# Patient Record
Sex: FEMALE | Employment: OTHER | URBAN - METROPOLITAN AREA
[De-identification: names, ages, dates, MRNs, and addresses within clinical notes are randomized per-mention and may not be internally consistent; named-entity substitution may affect disease eponyms.]

---

## 2024-05-30 ENCOUNTER — OFFICE VISIT (OUTPATIENT)
Dept: INTERNAL MEDICINE CLINIC | Age: 79
End: 2024-05-30
Payer: MEDICARE

## 2024-05-30 VITALS
HEIGHT: 65 IN | HEART RATE: 72 BPM | WEIGHT: 125.4 LBS | SYSTOLIC BLOOD PRESSURE: 122 MMHG | BODY MASS INDEX: 20.89 KG/M2 | DIASTOLIC BLOOD PRESSURE: 86 MMHG | OXYGEN SATURATION: 98 %

## 2024-05-30 DIAGNOSIS — R73.01 IMPAIRED FASTING BLOOD SUGAR: ICD-10-CM

## 2024-05-30 DIAGNOSIS — L43.9 LICHEN PLANUS: ICD-10-CM

## 2024-05-30 DIAGNOSIS — H81.09 VERTIGO, LABYRINTHINE, UNSPECIFIED LATERALITY: ICD-10-CM

## 2024-05-30 DIAGNOSIS — K21.9 GASTROESOPHAGEAL REFLUX DISEASE WITHOUT ESOPHAGITIS: ICD-10-CM

## 2024-05-30 DIAGNOSIS — N95.2 ATROPHIC VAGINITIS: ICD-10-CM

## 2024-05-30 DIAGNOSIS — I10 PRIMARY HYPERTENSION: ICD-10-CM

## 2024-05-30 DIAGNOSIS — Z79.890 HORMONE REPLACEMENT THERAPY: ICD-10-CM

## 2024-05-30 DIAGNOSIS — E03.9 ACQUIRED HYPOTHYROIDISM: Primary | ICD-10-CM

## 2024-05-30 DIAGNOSIS — R41.89 COGNITIVE IMPAIRMENT: ICD-10-CM

## 2024-05-30 DIAGNOSIS — M81.0 AGE-RELATED OSTEOPOROSIS WITHOUT CURRENT PATHOLOGICAL FRACTURE: ICD-10-CM

## 2024-05-30 DIAGNOSIS — E03.9 ACQUIRED HYPOTHYROIDISM: ICD-10-CM

## 2024-05-30 DIAGNOSIS — J30.89 ENVIRONMENTAL AND SEASONAL ALLERGIES: ICD-10-CM

## 2024-05-30 PROCEDURE — G8427 DOCREV CUR MEDS BY ELIG CLIN: HCPCS | Performed by: INTERNAL MEDICINE

## 2024-05-30 PROCEDURE — G8400 PT W/DXA NO RESULTS DOC: HCPCS | Performed by: INTERNAL MEDICINE

## 2024-05-30 PROCEDURE — 1036F TOBACCO NON-USER: CPT | Performed by: INTERNAL MEDICINE

## 2024-05-30 PROCEDURE — 3079F DIAST BP 80-89 MM HG: CPT | Performed by: INTERNAL MEDICINE

## 2024-05-30 PROCEDURE — G8420 CALC BMI NORM PARAMETERS: HCPCS | Performed by: INTERNAL MEDICINE

## 2024-05-30 PROCEDURE — 3074F SYST BP LT 130 MM HG: CPT | Performed by: INTERNAL MEDICINE

## 2024-05-30 PROCEDURE — 1123F ACP DISCUSS/DSCN MKR DOCD: CPT | Performed by: INTERNAL MEDICINE

## 2024-05-30 PROCEDURE — 1090F PRES/ABSN URINE INCON ASSESS: CPT | Performed by: INTERNAL MEDICINE

## 2024-05-30 PROCEDURE — 99204 OFFICE O/P NEW MOD 45 MIN: CPT | Performed by: INTERNAL MEDICINE

## 2024-05-30 RX ORDER — FLUTICASONE PROPIONATE 50 MCG
1 SPRAY, SUSPENSION (ML) NASAL DAILY
COMMUNITY

## 2024-05-30 RX ORDER — FUROSEMIDE 20 MG/1
TABLET ORAL
COMMUNITY
Start: 2024-04-01

## 2024-05-30 RX ORDER — OMEPRAZOLE 20 MG/1
20 CAPSULE, DELAYED RELEASE ORAL DAILY
COMMUNITY

## 2024-05-30 RX ORDER — LEVOTHYROXINE SODIUM 0.07 MG/1
TABLET ORAL
COMMUNITY
Start: 2024-04-01

## 2024-05-30 RX ORDER — IBUPROFEN 200 MG
200 TABLET ORAL EVERY 6 HOURS PRN
COMMUNITY
End: 2024-05-30

## 2024-05-30 RX ORDER — ESTRADIOL 0.5 MG/1
TABLET ORAL
COMMUNITY
Start: 2024-04-01

## 2024-05-30 RX ORDER — MECLIZINE HYDROCHLORIDE 25 MG/1
TABLET ORAL
COMMUNITY
Start: 2024-04-21

## 2024-05-30 RX ORDER — CETIRIZINE HYDROCHLORIDE 10 MG/1
10 TABLET ORAL DAILY
COMMUNITY

## 2024-05-30 RX ORDER — ACETAMINOPHEN 500 MG
500 TABLET ORAL EVERY 6 HOURS PRN
COMMUNITY

## 2024-05-30 SDOH — ECONOMIC STABILITY: INCOME INSECURITY: HOW HARD IS IT FOR YOU TO PAY FOR THE VERY BASICS LIKE FOOD, HOUSING, MEDICAL CARE, AND HEATING?: NOT HARD AT ALL

## 2024-05-30 SDOH — ECONOMIC STABILITY: FOOD INSECURITY: WITHIN THE PAST 12 MONTHS, THE FOOD YOU BOUGHT JUST DIDN'T LAST AND YOU DIDN'T HAVE MONEY TO GET MORE.: NEVER TRUE

## 2024-05-30 SDOH — ECONOMIC STABILITY: HOUSING INSECURITY
IN THE LAST 12 MONTHS, WAS THERE A TIME WHEN YOU DID NOT HAVE A STEADY PLACE TO SLEEP OR SLEPT IN A SHELTER (INCLUDING NOW)?: NO

## 2024-05-30 SDOH — ECONOMIC STABILITY: FOOD INSECURITY: WITHIN THE PAST 12 MONTHS, YOU WORRIED THAT YOUR FOOD WOULD RUN OUT BEFORE YOU GOT MONEY TO BUY MORE.: NEVER TRUE

## 2024-05-30 ASSESSMENT — PATIENT HEALTH QUESTIONNAIRE - PHQ9
1. LITTLE INTEREST OR PLEASURE IN DOING THINGS: NOT AT ALL
SUM OF ALL RESPONSES TO PHQ QUESTIONS 1-9: 0
SUM OF ALL RESPONSES TO PHQ QUESTIONS 1-9: 0
2. FEELING DOWN, DEPRESSED OR HOPELESS: NOT AT ALL
SUM OF ALL RESPONSES TO PHQ9 QUESTIONS 1 & 2: 0
SUM OF ALL RESPONSES TO PHQ QUESTIONS 1-9: 0
SUM OF ALL RESPONSES TO PHQ QUESTIONS 1-9: 0

## 2024-05-30 ASSESSMENT — ENCOUNTER SYMPTOMS
BACK PAIN: 0
COLOR CHANGE: 0
WHEEZING: 0
CONSTIPATION: 0
ABDOMINAL PAIN: 0
SHORTNESS OF BREATH: 0
NAUSEA: 0
VOMITING: 0
COUGH: 0
SORE THROAT: 0
SINUS PRESSURE: 0
CHEST TIGHTNESS: 0

## 2024-05-30 NOTE — ASSESSMENT & PLAN NOTE
patient has both oral and cutaneous lesions in the past, currently none in the oral cavity but has scattered spots on her lower extremities.  No further intervention at this point

## 2024-05-30 NOTE — ASSESSMENT & PLAN NOTE
currently not taking omeprazole and denies any symptoms, will continue to monitor off medication, reinforced antireflux diet and GERD lifestyle modification changes

## 2024-05-30 NOTE — ASSESSMENT & PLAN NOTE
will update labs and make recommendation on levothyroxine dose as patient is reporting this has been fluctuating lately but has been compliant with taking current recommended dose.

## 2024-05-30 NOTE — ASSESSMENT & PLAN NOTE
patient believes she is up-to-date with recommended vaccines, has been doing well on cetirizine and Flonase, will continue same, continue to avoid known triggers if able to

## 2024-05-30 NOTE — ASSESSMENT & PLAN NOTE
patient states she has been on estradiol 0.5 mg for large number of years, reports it is mostly for vaginal pain and atrophic vaginitis because the cream did not work, she is aware of the risks associated with long-term hormone replacement therapy especially at her age however she does not want to stop it or attempt reducing the dose because it has been working just fine for her.  She does not go for mammograms routinely and last one was over 2 years ago.

## 2024-05-30 NOTE — ASSESSMENT & PLAN NOTE
patient reports DEXA scan did show osteoporosis however she is declining any form of therapy, she does not want to have updated study and admits to not taking calcium and vitamin D consistently.  Recommended daily weightbearing activity and encouraged to at least take daily vitamin D and calcium supplements if will not reconsider therapy

## 2024-05-30 NOTE — ASSESSMENT & PLAN NOTE
stable, has been using as needed meclizine, reports had imaging and workup done in [place], will await records

## 2024-05-30 NOTE — ASSESSMENT & PLAN NOTE
patient has been diagnosed with dementia however there are no other records or information about her diagnosis,  reports she did see neurology, he also privately reported some paranoid behavior.  Patient currently wearing an ankle monitor at the [facility] community she is residing in her right now and she is being placed in the memory unit until further evaluation.  Again will await previous records

## 2024-05-30 NOTE — ASSESSMENT & PLAN NOTE
patient reports she had multiple allergies to antihypertensives in the past and has been maintained on 20 mg of Lasix for blood pressure management.  Will continue same for now until records available from [place], [facility] maintaining healthy low-salt diet and active lifestyle

## 2024-05-30 NOTE — PROGRESS NOTES
ASSESSMENT/PLAN:  1. Acquired hypothyroidism  Assessment & Plan:    will update labs and make recommendation on levothyroxine dose as patient is reporting this has been fluctuating lately but has been compliant with taking current recommended dose.  Orders:  -     Lipid Panel; Future  -     TSH with Reflex to FT4; Future  2. Primary hypertension  Assessment & Plan:    patient reports she had multiple allergies to antihypertensives in the past and has been maintained on 20 mg of Lasix for blood pressure management.  Will continue same for now until records available from [place], encouraged maintaining healthy low-salt diet and active lifestyle  Orders:  -     CBC; Future  -     Comprehensive Metabolic Panel; Future  -     Lipid Panel; Future  3. Environmental and seasonal allergies  Assessment & Plan:    patient believes she is up-to-date with recommended vaccines, has been doing well on cetirizine and Flonase, will continue same, continue to avoid known triggers if able to  4. Gastroesophageal reflux disease without esophagitis  Assessment & Plan:    currently not taking omeprazole and denies any symptoms, will continue to monitor off medication, reinforced antireflux diet and GERD lifestyle modification changes  5. Vertigo, labyrinthine, unspecified laterality  Assessment & Plan:    stable, has been using as needed meclizine, reports had imaging and workup done in [place], will await records  6. Hormone replacement therapy  Assessment & Plan:    patient states she has been on estradiol 0.5 mg for large number of years, reports it is mostly for vaginal pain and atrophic vaginitis because the cream did not work, she is aware of the risks associated with long-term hormone replacement therapy especially at her age however she does not want to stop it or attempt reducing the dose because it has been working just fine for her.  She does not go for mammograms routinely and last one was over 2 years ago.  7. Atrophic

## 2024-05-31 LAB
ALBUMIN SERPL-MCNC: 4.4 G/DL (ref 3.4–5)
ALBUMIN/GLOB SERPL: 1.5 {RATIO} (ref 1.1–2.2)
ALP SERPL-CCNC: 75 U/L (ref 40–129)
ALT SERPL-CCNC: 9 U/L (ref 10–40)
ANION GAP SERPL CALCULATED.3IONS-SCNC: 11 MMOL/L (ref 3–16)
AST SERPL-CCNC: 19 U/L (ref 15–37)
BILIRUB SERPL-MCNC: 0.4 MG/DL (ref 0–1)
BUN SERPL-MCNC: 16 MG/DL (ref 7–20)
CALCIUM SERPL-MCNC: 9.5 MG/DL (ref 8.3–10.6)
CHLORIDE SERPL-SCNC: 99 MMOL/L (ref 99–110)
CHOLEST SERPL-MCNC: 261 MG/DL (ref 0–199)
CO2 SERPL-SCNC: 30 MMOL/L (ref 21–32)
CREAT SERPL-MCNC: 0.8 MG/DL (ref 0.6–1.2)
DEPRECATED RDW RBC AUTO: 16.8 % (ref 12.4–15.4)
EST. AVERAGE GLUCOSE BLD GHB EST-MCNC: 91.1 MG/DL
GFR SERPLBLD CREATININE-BSD FMLA CKD-EPI: 76 ML/MIN/{1.73_M2}
GLUCOSE SERPL-MCNC: 86 MG/DL (ref 70–99)
HBA1C MFR BLD: 4.8 %
HCT VFR BLD AUTO: 39.5 % (ref 36–48)
HDLC SERPL-MCNC: 76 MG/DL (ref 40–60)
HGB BLD-MCNC: 13.2 G/DL (ref 12–16)
LDLC SERPL CALC-MCNC: 167 MG/DL
MCH RBC QN AUTO: 28 PG (ref 26–34)
MCHC RBC AUTO-ENTMCNC: 33.4 G/DL (ref 31–36)
MCV RBC AUTO: 83.8 FL (ref 80–100)
PLATELET # BLD AUTO: 241 K/UL (ref 135–450)
PMV BLD AUTO: 9.7 FL (ref 5–10.5)
POTASSIUM SERPL-SCNC: 3.9 MMOL/L (ref 3.5–5.1)
PROT SERPL-MCNC: 7.3 G/DL (ref 6.4–8.2)
RBC # BLD AUTO: 4.72 M/UL (ref 4–5.2)
SODIUM SERPL-SCNC: 140 MMOL/L (ref 136–145)
TRIGL SERPL-MCNC: 89 MG/DL (ref 0–150)
TSH SERPL DL<=0.005 MIU/L-ACNC: 0.48 UIU/ML (ref 0.27–4.2)
VLDLC SERPL CALC-MCNC: 18 MG/DL
WBC # BLD AUTO: 5.4 K/UL (ref 4–11)

## 2024-06-06 ENCOUNTER — TELEPHONE (OUTPATIENT)
Dept: INTERNAL MEDICINE CLINIC | Age: 79
End: 2024-06-06

## 2024-06-06 NOTE — TELEPHONE ENCOUNTER
Ashley from [facility]. States pt is a memory care resident at their assisted living facility. Spouse did bring pt in for appt but he does not monitor any meds for pt. Paperwork was faxed over on 6/4 and they will need that signed for state regulation. Pt's medication is administered by facility since she is a memory care resident (does not do her own meds), states any new orders they will need faxed or called in. Ashley asks if last OV note & labs can be faxed to [phone]. If needed Ashley can be reached at [phone].

## 2024-06-26 ENCOUNTER — TELEPHONE (OUTPATIENT)
Dept: INTERNAL MEDICINE CLINIC | Age: 79
End: 2024-06-26

## 2024-06-26 RX ORDER — CETIRIZINE HYDROCHLORIDE 10 MG/1
10 TABLET ORAL DAILY
Status: CANCELLED | OUTPATIENT
Start: 2024-06-26

## 2024-06-26 RX ORDER — ESTRADIOL 0.5 MG/1
TABLET ORAL
Qty: 21 TABLET | Status: CANCELLED | OUTPATIENT
Start: 2024-06-26

## 2024-06-26 RX ORDER — LEVOTHYROXINE SODIUM 0.07 MG/1
TABLET ORAL
Qty: 30 TABLET | Status: CANCELLED | OUTPATIENT
Start: 2024-06-26

## 2024-06-26 RX ORDER — FUROSEMIDE 20 MG/1
TABLET ORAL
Qty: 60 TABLET | Status: CANCELLED | OUTPATIENT
Start: 2024-06-26

## 2024-06-26 RX ORDER — MECLIZINE HYDROCHLORIDE 25 MG/1
TABLET ORAL
Status: CANCELLED | OUTPATIENT
Start: 2024-06-26

## 2024-06-26 RX ORDER — OMEPRAZOLE 20 MG/1
20 CAPSULE, DELAYED RELEASE ORAL DAILY
Qty: 90 CAPSULE | Refills: 1 | Status: CANCELLED | OUTPATIENT
Start: 2024-06-26

## 2024-06-27 ENCOUNTER — OFFICE VISIT (OUTPATIENT)
Dept: INTERNAL MEDICINE CLINIC | Age: 79
End: 2024-06-27
Payer: MEDICARE

## 2024-06-27 VITALS
WEIGHT: 124.2 LBS | SYSTOLIC BLOOD PRESSURE: 128 MMHG | OXYGEN SATURATION: 98 % | BODY MASS INDEX: 20.67 KG/M2 | DIASTOLIC BLOOD PRESSURE: 70 MMHG | HEART RATE: 65 BPM

## 2024-06-27 DIAGNOSIS — J30.89 ENVIRONMENTAL AND SEASONAL ALLERGIES: ICD-10-CM

## 2024-06-27 DIAGNOSIS — N95.2 ATROPHIC VAGINITIS: ICD-10-CM

## 2024-06-27 DIAGNOSIS — Z79.890 HORMONE REPLACEMENT THERAPY: ICD-10-CM

## 2024-06-27 DIAGNOSIS — Z00.00 MEDICARE ANNUAL WELLNESS VISIT, SUBSEQUENT: Primary | ICD-10-CM

## 2024-06-27 DIAGNOSIS — H81.09 VERTIGO, LABYRINTHINE, UNSPECIFIED LATERALITY: ICD-10-CM

## 2024-06-27 DIAGNOSIS — I10 PRIMARY HYPERTENSION: ICD-10-CM

## 2024-06-27 DIAGNOSIS — R41.89 COGNITIVE IMPAIRMENT: ICD-10-CM

## 2024-06-27 DIAGNOSIS — E03.9 ACQUIRED HYPOTHYROIDISM: ICD-10-CM

## 2024-06-27 LAB
BILIRUBIN, POC: NEGATIVE
BLOOD URINE, POC: NEGATIVE
CLARITY, POC: CLEAR
COLOR, POC: YELLOW
GLUCOSE URINE, POC: NEGATIVE
KETONES, POC: NEGATIVE
LEUKOCYTE EST, POC: NEGATIVE
NITRITE, POC: NEGATIVE
PH, POC: 6.5
PROTEIN, POC: NEGATIVE
SPECIFIC GRAVITY, POC: 1.01
UROBILINOGEN, POC: 0.2

## 2024-06-27 PROCEDURE — 81002 URINALYSIS NONAUTO W/O SCOPE: CPT | Performed by: INTERNAL MEDICINE

## 2024-06-27 PROCEDURE — 3078F DIAST BP <80 MM HG: CPT | Performed by: INTERNAL MEDICINE

## 2024-06-27 PROCEDURE — 1123F ACP DISCUSS/DSCN MKR DOCD: CPT | Performed by: INTERNAL MEDICINE

## 2024-06-27 PROCEDURE — G0439 PPPS, SUBSEQ VISIT: HCPCS | Performed by: INTERNAL MEDICINE

## 2024-06-27 PROCEDURE — 3074F SYST BP LT 130 MM HG: CPT | Performed by: INTERNAL MEDICINE

## 2024-06-27 RX ORDER — ESTRADIOL 0.5 MG/1
0.5 TABLET ORAL DAILY
Qty: 90 TABLET | Refills: 1 | Status: SHIPPED | OUTPATIENT
Start: 2024-06-27

## 2024-06-27 RX ORDER — LEVOTHYROXINE SODIUM 0.07 MG/1
75 TABLET ORAL DAILY
Qty: 90 TABLET | Refills: 1 | Status: SHIPPED | OUTPATIENT
Start: 2024-06-27

## 2024-06-27 RX ORDER — FUROSEMIDE 20 MG/1
20 TABLET ORAL DAILY
Qty: 90 TABLET | Refills: 1 | Status: SHIPPED | OUTPATIENT
Start: 2024-06-27

## 2024-06-27 RX ORDER — MECLIZINE HYDROCHLORIDE 25 MG/1
25 TABLET ORAL 3 TIMES DAILY PRN
Qty: 90 TABLET | Refills: 0 | Status: SHIPPED | OUTPATIENT
Start: 2024-06-27 | End: 2024-09-25

## 2024-06-27 RX ORDER — CETIRIZINE HYDROCHLORIDE 10 MG/1
10 TABLET ORAL DAILY
Qty: 90 TABLET | Refills: 1 | Status: SHIPPED | OUTPATIENT
Start: 2024-06-27

## 2024-06-27 RX ORDER — FLUTICASONE PROPIONATE 50 MCG
1 SPRAY, SUSPENSION (ML) NASAL DAILY
Qty: 16 G | Refills: 1 | Status: SHIPPED | OUTPATIENT
Start: 2024-06-27

## 2024-06-27 ASSESSMENT — PATIENT HEALTH QUESTIONNAIRE - PHQ9
SUM OF ALL RESPONSES TO PHQ QUESTIONS 1-9: 0
SUM OF ALL RESPONSES TO PHQ QUESTIONS 1-9: 0
2. FEELING DOWN, DEPRESSED OR HOPELESS: NOT AT ALL
1. LITTLE INTEREST OR PLEASURE IN DOING THINGS: NOT AT ALL
SUM OF ALL RESPONSES TO PHQ QUESTIONS 1-9: 0
SUM OF ALL RESPONSES TO PHQ9 QUESTIONS 1 & 2: 0
SUM OF ALL RESPONSES TO PHQ QUESTIONS 1-9: 0

## 2024-06-27 ASSESSMENT — ENCOUNTER SYMPTOMS
CONSTIPATION: 0
VOMITING: 0
SORE THROAT: 0
WHEEZING: 0
CHEST TIGHTNESS: 0
ABDOMINAL PAIN: 0
COLOR CHANGE: 0
SHORTNESS OF BREATH: 0
NAUSEA: 0
COUGH: 0
BACK PAIN: 0

## 2024-06-27 ASSESSMENT — LIFESTYLE VARIABLES
HOW OFTEN DO YOU HAVE A DRINK CONTAINING ALCOHOL: NEVER
HOW MANY STANDARD DRINKS CONTAINING ALCOHOL DO YOU HAVE ON A TYPICAL DAY: PATIENT DOES NOT DRINK

## 2024-06-27 NOTE — ASSESSMENT & PLAN NOTE
patient currently a resident of the memory unit at the [facility],  resides same facility but in the independent living part.  Urine analysis checked today due to worsening confusion lately however no evidence of UTI.  Explained to patient's  symptoms are most likely secondary to natural progression of her dementia and recent change in living environment.  Patient herself continues to project that she feels fine and does not have any concerns.

## 2024-06-27 NOTE — PROGRESS NOTES
Medicare Annual Wellness Visit  Name: Sheila Sierra Today’s Date: 2024   MRN: [id number] Sex: Female   Age: 78 y.o. Ethnicity: Unavailable / Unknown   : 1945 Race: Unavailable      Sheila Sierra is here for Medicare AWV     Screenings for behavioral, psychosocial and functional/safety risks, and cognitive dysfunction are all negative except as indicated below. These results, as well as other patient data from the Health Risk Assessment form, are documented in Flowsheets linked to this Encounter.    Allergies   Allergen Reactions    Ace Inhibitors     Belladonna     Beta Adrenergic Blockers     Flagyl [Metronidazole]     Gluten     Iodinated Contrast Media     Scopolamine     Cefdinir        Prior to Visit Medications    Medication Sig Taking? Authorizing Provider   estradiol (ESTRACE) 0.5 MG tablet Take 1 tablet by mouth daily Yes Madai Gaming MD   furosemide (LASIX) 20 MG tablet Take 1 tablet by mouth daily Yes Madai Gaming MD   levothyroxine (SYNTHROID) 75 MCG tablet Take 1 tablet by mouth Daily Yes Madai Gaming MD   meclizine (ANTIVERT) 25 MG tablet Take 1 tablet by mouth 3 times daily as needed for Dizziness Yes Madai Gaming MD   fluticasone (FLONASE ALLERGY RELIEF) 50 MCG/ACT nasal spray 1 spray by Each Nostril route daily Yes Madai Gaming MD   cetirizine (ZYRTEC ALLERGY) 10 MG tablet Take 1 tablet by mouth daily Yes Madai Gaming MD   Glycerin-Polysorbate 80 (REFRESH DRY EYE THERAPY OP) Apply to eye Yes Atif Noguera MD   omeprazole (PRILOSEC) 20 MG delayed release capsule Take 1 capsule by mouth daily Yes Atif Noguera MD   acetaminophen (TYLENOL) 500 MG tablet Take 1 tablet by mouth every 6 hours as needed for Pain  Atif Noguera MD       Past Medical History:   Diagnosis Date    Allergic rhinitis     Celiac disease     Chronic fatigue syndrome     Chronic kidney disease     COVID     Dysphagia     Episodes of decreased attentiveness

## 2024-07-27 PROBLEM — Z00.00 MEDICARE ANNUAL WELLNESS VISIT, SUBSEQUENT: Status: RESOLVED | Noted: 2024-06-27 | Resolved: 2024-07-27

## 2024-07-30 ENCOUNTER — TELEPHONE (OUTPATIENT)
Dept: INTERNAL MEDICINE CLINIC | Age: 79
End: 2024-07-30

## 2024-07-30 DIAGNOSIS — R41.89 COGNITIVE IMPAIRMENT: Primary | ICD-10-CM

## 2024-07-30 NOTE — TELEPHONE ENCOUNTER
----- Message from Joyce Rollins sent at 7/30/2024 10:33 AM EDT -----  Regarding: [facility] Referral Request  [facility] Referral Request    Reason for referral request: Specialty Provider Neurologist    Specialist/Lab/Test patient is requesting (if known):    Specialist Phone Number (if applicable):    Additional Information Spouse is requesting for referral from the pcp to have a neurology appointment and send it to [facility] neurology  --------------------------------------------------------------------------------------------------------------------------    Relationship to Patient: Spouse/Partner Fracisco Sierra - / spouse     Call Back Information: OK to leave message on voicemail  Preferred Call Back Number: Phone

## 2024-07-30 NOTE — TELEPHONE ENCOUNTER
requesting for referral from the pcp to have a neurology appointment and send it to [facility] neurology

## 2024-08-15 ENCOUNTER — APPOINTMENT (OUTPATIENT)
Dept: CT IMAGING | Age: 79
End: 2024-08-15
Payer: MEDICARE

## 2024-08-15 ENCOUNTER — APPOINTMENT (OUTPATIENT)
Dept: GENERAL RADIOLOGY | Age: 79
End: 2024-08-15
Payer: MEDICARE

## 2024-08-15 ENCOUNTER — HOSPITAL ENCOUNTER (EMERGENCY)
Age: 79
Discharge: HOME | End: 2024-08-15
Attending: EMERGENCY MEDICINE
Payer: MEDICARE

## 2024-08-15 VITALS
HEIGHT: 65 IN | TEMPERATURE: 97.6 F | RESPIRATION RATE: 15 BRPM | SYSTOLIC BLOOD PRESSURE: 136 MMHG | OXYGEN SATURATION: 99 % | BODY MASS INDEX: 20.83 KG/M2 | DIASTOLIC BLOOD PRESSURE: 67 MMHG | WEIGHT: 125 LBS | HEART RATE: 63 BPM

## 2024-08-15 DIAGNOSIS — E87.6 HYPOKALEMIA: ICD-10-CM

## 2024-08-15 DIAGNOSIS — Z23 TETANUS TOXOID VACCINATION ADMINISTERED AT CURRENT VISIT: ICD-10-CM

## 2024-08-15 DIAGNOSIS — G93.32 CHRONIC FATIGUE SYNDROME: Primary | ICD-10-CM

## 2024-08-15 DIAGNOSIS — T07.XXXA MULTIPLE CONTUSIONS: ICD-10-CM

## 2024-08-15 LAB
ALBUMIN SERPL-MCNC: 3.9 G/DL (ref 3.4–5)
ALBUMIN/GLOB SERPL: 1.3 {RATIO} (ref 1.1–2.2)
ALP SERPL-CCNC: 65 U/L (ref 40–129)
ALT SERPL-CCNC: 11 U/L (ref 10–40)
ANION GAP SERPL CALCULATED.3IONS-SCNC: 19 MMOL/L (ref 3–16)
AST SERPL-CCNC: 22 U/L (ref 15–37)
BASOPHILS # BLD: 0 K/UL (ref 0–0.2)
BASOPHILS NFR BLD: 0.8 %
BILIRUB SERPL-MCNC: 0.6 MG/DL (ref 0–1)
BUN SERPL-MCNC: 17 MG/DL (ref 7–20)
CALCIUM SERPL-MCNC: 9.2 MG/DL (ref 8.3–10.6)
CHLORIDE SERPL-SCNC: 100 MMOL/L (ref 99–110)
CO2 SERPL-SCNC: 18 MMOL/L (ref 21–32)
CREAT SERPL-MCNC: 0.9 MG/DL (ref 0.6–1.2)
DEPRECATED RDW RBC AUTO: 17.3 % (ref 12.4–15.4)
EOSINOPHIL # BLD: 0 K/UL (ref 0–0.6)
EOSINOPHIL NFR BLD: 0.9 %
GFR SERPLBLD CREATININE-BSD FMLA CKD-EPI: 66 ML/MIN/{1.73_M2}
GLUCOSE SERPL-MCNC: 132 MG/DL (ref 70–99)
HCT VFR BLD AUTO: 39.9 % (ref 36–48)
HGB BLD-MCNC: 13.3 G/DL (ref 12–16)
LYMPHOCYTES # BLD: 0.9 K/UL (ref 1–5.1)
LYMPHOCYTES NFR BLD: 21.6 %
MAGNESIUM SERPL-MCNC: 2.1 MG/DL (ref 1.8–2.4)
MCH RBC QN AUTO: 29.5 PG (ref 26–34)
MCHC RBC AUTO-ENTMCNC: 33.4 G/DL (ref 31–36)
MCV RBC AUTO: 88.5 FL (ref 80–100)
MONOCYTES # BLD: 0.4 K/UL (ref 0–1.3)
MONOCYTES NFR BLD: 8.5 %
NEUTROPHILS # BLD: 3 K/UL (ref 1.7–7.7)
NEUTROPHILS NFR BLD: 68.2 %
NT-PROBNP SERPL-MCNC: 445 PG/ML (ref 0–449)
PLATELET # BLD AUTO: 226 K/UL (ref 135–450)
PMV BLD AUTO: 8.9 FL (ref 5–10.5)
POTASSIUM SERPL-SCNC: 3.4 MMOL/L (ref 3.5–5.1)
PROT SERPL-MCNC: 7 G/DL (ref 6.4–8.2)
RBC # BLD AUTO: 4.5 M/UL (ref 4–5.2)
SODIUM SERPL-SCNC: 137 MMOL/L (ref 136–145)
TROPONIN, HIGH SENSITIVITY: 13 NG/L (ref 0–14)
TROPONIN, HIGH SENSITIVITY: 17 NG/L (ref 0–14)
WBC # BLD AUTO: 4.4 K/UL (ref 4–11)

## 2024-08-15 PROCEDURE — 85025 COMPLETE CBC W/AUTO DIFF WBC: CPT

## 2024-08-15 PROCEDURE — 72125 CT NECK SPINE W/O DYE: CPT

## 2024-08-15 PROCEDURE — 71045 X-RAY EXAM CHEST 1 VIEW: CPT

## 2024-08-15 PROCEDURE — 83735 ASSAY OF MAGNESIUM: CPT

## 2024-08-15 PROCEDURE — 84484 ASSAY OF TROPONIN QUANT: CPT

## 2024-08-15 PROCEDURE — 70486 CT MAXILLOFACIAL W/O DYE: CPT

## 2024-08-15 PROCEDURE — 36415 COLL VENOUS BLD VENIPUNCTURE: CPT

## 2024-08-15 PROCEDURE — 99285 EMERGENCY DEPT VISIT HI MDM: CPT

## 2024-08-15 PROCEDURE — 6360000002 HC RX W HCPCS: Performed by: PHYSICIAN ASSISTANT

## 2024-08-15 PROCEDURE — 80053 COMPREHEN METABOLIC PANEL: CPT

## 2024-08-15 PROCEDURE — 70450 CT HEAD/BRAIN W/O DYE: CPT

## 2024-08-15 PROCEDURE — 90471 IMMUNIZATION ADMIN: CPT | Performed by: PHYSICIAN ASSISTANT

## 2024-08-15 PROCEDURE — 73030 X-RAY EXAM OF SHOULDER: CPT

## 2024-08-15 PROCEDURE — 93005 ELECTROCARDIOGRAM TRACING: CPT | Performed by: PHYSICIAN ASSISTANT

## 2024-08-15 PROCEDURE — 90714 TD VACC NO PRESV 7 YRS+ IM: CPT | Performed by: PHYSICIAN ASSISTANT

## 2024-08-15 PROCEDURE — 83880 ASSAY OF NATRIURETIC PEPTIDE: CPT

## 2024-08-15 RX ORDER — POTASSIUM CHLORIDE 20 MEQ/1
40 TABLET, EXTENDED RELEASE ORAL ONCE
Status: DISCONTINUED | OUTPATIENT
Start: 2024-08-15 | End: 2024-08-15 | Stop reason: HOSPADM

## 2024-08-15 RX ADMIN — CLOSTRIDIUM TETANI TOXOID ANTIGEN (FORMALDEHYDE INACTIVATED) AND CORYNEBACTERIUM DIPHTHERIAE TOXOID ANTIGEN (FORMALDEHYDE INACTIVATED) 0.5 ML: 5; 2 INJECTION, SUSPENSION INTRAMUSCULAR at 17:09

## 2024-08-15 ASSESSMENT — PAIN - FUNCTIONAL ASSESSMENT: PAIN_FUNCTIONAL_ASSESSMENT: 0-10

## 2024-08-15 ASSESSMENT — PAIN SCALES - GENERAL: PAINLEVEL_OUTOF10: 5

## 2024-08-15 ASSESSMENT — ENCOUNTER SYMPTOMS
PHOTOPHOBIA: 0
CHEST TIGHTNESS: 0
DIARRHEA: 0
ABDOMINAL PAIN: 0
BACK PAIN: 0
SHORTNESS OF BREATH: 0
CONSTIPATION: 0
NAUSEA: 0
RESPIRATORY NEGATIVE: 1
VOMITING: 0
COUGH: 0
COLOR CHANGE: 0

## 2024-08-15 ASSESSMENT — PAIN DESCRIPTION - DESCRIPTORS: DESCRIPTORS: ACHING

## 2024-08-15 ASSESSMENT — PAIN DESCRIPTION - ORIENTATION: ORIENTATION: RIGHT

## 2024-08-15 ASSESSMENT — PAIN DESCRIPTION - LOCATION: LOCATION: SHOULDER;HEAD

## 2024-08-15 ASSESSMENT — PAIN DESCRIPTION - PAIN TYPE: TYPE: ACUTE PAIN

## 2024-08-15 NOTE — ED PROVIDER NOTES
[facility] EMERGENCY DEPARTMENT  EMERGENCY DEPARTMENT ENCOUNTER        Pt Name: Sheila Sierra  MRN: [id number]  Birthdate 1945  Date of evaluation: 8/15/2024  Provider: GUTIERREZ Jackson  PCP: Madai Gaming MD  Note Started: 4:49 PM EDT 8/15/24       I have seen and evaluated this patient with my supervising physician Mary      CHIEF COMPLAINT       Chief Complaint   Patient presents with    Fall     Patient has chronic fatigue syndrome and was shopping and felt the need to sit but then collapsed. No loss of consciousness. No blood thinners. Laceration under right eye. Pain on right shoulder. Patient was brought by [facility] [place]        HISTORY OF PRESENT ILLNESS: 1 or more Elements     History From: Patient  Limitations to history : None    Sheila Sierra is a 78 y.o. female with past medical history of hypertension, vertigo who presents ED with complaint of a fall.  Patient reports she has chronic fatigue syndrome.  She reports she was shopping.  States she started to feel weak and fatigued.  Felt like she was going to pass out.  States this happens frequently with her chronic fatigue syndrome.  She is normally she is able to sit down and symptoms improved fairly significantly pretty quickly.  She was shopping in the middle of an island when she states there was no place to sit down.  She states she eventually made to someplace to sit down at the end of the aisle but when she sat down she states she collapsed.  She reports she does not believe she lost consciousness.  She hit her face.  She is not on blood thinners.  She is complaint abrasions to the right-sided cheek.  Denies eye pain or visual changes.  Denies speech disturbances.  Denies numbness or tingling.  Denies lightheadedness or dizziness currently.  Is complaint of pain to her head, neck and right shoulder.  Denies chest pain or shortness of breath.  Denies abdominal pain, nausea/vomiting, urinary symptoms or changes

## 2024-08-15 NOTE — ED PROVIDER NOTES
I personally saw the patient and made/approved the management plan and take responsibility for the patient management.    For further details of Sheila Sierra's emergency department encounter, please see the advanced practice provider's documentation.    I, Peter Hernandez MD, am the primary physician provider of record.    CHIEF COMPLAINT  Chief Complaint   Patient presents with    Fall     Patient has chronic fatigue syndrome and was shopping and felt the need to sit but then collapsed. No loss of consciousness. No blood thinners. Laceration under right eye. Pain on right shoulder. Patient was brought by Janny EDDY      Briefly, Sheila Sierra is a 78 y.o. female  who presents to the ED complaining of being at [facility] today and became fatigued.  Has chronic fatigue syndrome today which may be a contributing factor.  Did not have LOC or syncope though.  Says that she gets episodes like this with her chronic fatigue syndrome periodically and felt similar to her.  She was in the middle of an aisle and not able to sit down so she fell and sustained abrasions to the R cheek and pain in the R shoulder from it.  Not anticoagulated, has not vomited.    FOCUSED PHYSICAL EXAMINATION  /67   Pulse 63   Temp 97.6 °F (36.4 °C) (Oral)   Resp 15   Ht 1.651 m (5' 5\")   Wt 56.7 kg (125 lb)   SpO2 99%   BMI 20.80 kg/m²    Focused physical examination notable for Abrasions to the R cheek and nasal bridge with bruising, mild ttp to the R shoulder, no wounds requiring primary repair or open lacerations, no T/L spine ttp, mild Cspine ttp.  No acute distress, well-appearing, well-nourished, normal speech and mentation without obvious facial droop, no obvious rash.  No obvious cranial nerve deficits on my initial exam. RRR CTAB.      EKG performed in ED:  The 12 lead EKG was interpreted by me independent of a cardiologist as follows:  Rate: normal with a rate of 65  Rhythm: sinus  Axis: left deviation  Intervals:  unremarkable.  She has minimal hypokalemia of likely indeterminate acute significance but was repleted with p.o. potassium.  She also appears a little dehydrated so oral hydration was recommended at home and tetanus was updated as well.  Other imaging includes chest x-ray and right shoulder x-ray which are negative for acute traumatic abnormalities.  Patient will be discharged home and hydration was encouraged at home as well as close PCP follow-up and strict return precautions for any new or worsening symptoms.  She is ambulatory and asymptomatic on discharge evaluation.  No focal numbness weakness or tingling in any point to suggest a strokelike pattern and she has not been dizzy and did not denies any vertigo.      Is this patient to be included in the SEP-1 Core Measure?  No     Exclusion criteria - the patient is NOT to be included for SEP-1 Core Measure due to:  Infection is not suspected      Consults:  None    History obtained from: Patient and Significant other    Pertinent social determinants of health: None applicable    Chronic conditions potentially affecting care:  chronic fatigue syndrome    Review of other records:  None    Reassessment:  See MDM for details of medications given and reassessment    During the patient's ED course, the patient was given:  Medications   potassium chloride (KLOR-CON M) extended release tablet 40 mEq (has no administration in time range)   tetanus & diphtheria toxoids (adult) 5-2 LFU injection 0.5 mL (0.5 mLs IntraMUSCular Given 8/15/24 1709)        CLINICAL IMPRESSION  1. Chronic fatigue syndrome    2. Multiple contusions    3. Hypokalemia    4. Tetanus toxoid vaccination administered at current visit        Blood pressure 136/67, pulse 63, temperature 97.6 °F (36.4 °C), temperature source Oral, resp. rate 15, height 1.651 m (5' 5\"), weight 56.7 kg (125 lb), SpO2 99%.    DISPOSITION  Sheila Sierra was discharged in stable condition.    I have discussed the findings

## 2024-08-15 NOTE — ED NOTES
Pt ambulated to exit independently.  Discharge instructions given to pt.  Verbalized understanding

## 2024-08-16 ENCOUNTER — TELEPHONE (OUTPATIENT)
Dept: INTERNAL MEDICINE CLINIC | Age: 79
End: 2024-08-16

## 2024-08-16 LAB
EKG ATRIAL RATE: 65 BPM
EKG DIAGNOSIS: NORMAL
EKG P AXIS: 62 DEGREES
EKG P-R INTERVAL: 178 MS
EKG Q-T INTERVAL: 446 MS
EKG QRS DURATION: 84 MS
EKG QTC CALCULATION (BAZETT): 463 MS
EKG R AXIS: -37 DEGREES
EKG T AXIS: 48 DEGREES
EKG VENTRICULAR RATE: 65 BPM

## 2024-08-16 PROCEDURE — 93010 ELECTROCARDIOGRAM REPORT: CPT | Performed by: INTERNAL MEDICINE

## 2024-08-16 NOTE — TELEPHONE ENCOUNTER
[facility].out with , had a fall. Went to hospital, contusions (many) potassium was given at hospital/low. Alma Munoz[place]. [phone]. Calling to update pcp, call with any update or questions.

## 2024-08-21 DIAGNOSIS — Z79.890 HORMONE REPLACEMENT THERAPY: ICD-10-CM

## 2024-08-21 DIAGNOSIS — E03.9 ACQUIRED HYPOTHYROIDISM: ICD-10-CM

## 2024-08-21 DIAGNOSIS — N95.2 ATROPHIC VAGINITIS: ICD-10-CM

## 2024-08-21 DIAGNOSIS — I10 PRIMARY HYPERTENSION: ICD-10-CM

## 2024-08-22 RX ORDER — ESTRADIOL 0.5 MG/1
0.5 TABLET ORAL DAILY
Qty: 100 TABLET | Refills: 2 | Status: SHIPPED | OUTPATIENT
Start: 2024-08-22

## 2024-08-22 RX ORDER — LEVOTHYROXINE SODIUM 0.07 MG/1
75 TABLET ORAL DAILY
Qty: 100 TABLET | Refills: 2 | Status: SHIPPED | OUTPATIENT
Start: 2024-08-22

## 2024-08-22 RX ORDER — FUROSEMIDE 20 MG/1
20 TABLET ORAL DAILY
Qty: 100 TABLET | Refills: 2 | Status: SHIPPED | OUTPATIENT
Start: 2024-08-22

## 2024-12-20 ENCOUNTER — OFFICE VISIT (OUTPATIENT)
Dept: INTERNAL MEDICINE CLINIC | Age: 79
End: 2024-12-20
Payer: MEDICARE

## 2024-12-20 VITALS
SYSTOLIC BLOOD PRESSURE: 132 MMHG | OXYGEN SATURATION: 98 % | DIASTOLIC BLOOD PRESSURE: 76 MMHG | HEART RATE: 65 BPM | BODY MASS INDEX: 19.2 KG/M2 | WEIGHT: 115.4 LBS

## 2024-12-20 DIAGNOSIS — R41.89 COGNITIVE IMPAIRMENT: ICD-10-CM

## 2024-12-20 DIAGNOSIS — D64.9 ANEMIA, UNSPECIFIED TYPE: ICD-10-CM

## 2024-12-20 DIAGNOSIS — I10 PRIMARY HYPERTENSION: ICD-10-CM

## 2024-12-20 DIAGNOSIS — R53.83 FATIGUE, UNSPECIFIED TYPE: ICD-10-CM

## 2024-12-20 DIAGNOSIS — E55.9 VITAMIN D DEFICIENCY: ICD-10-CM

## 2024-12-20 DIAGNOSIS — M15.0 PRIMARY OSTEOARTHRITIS INVOLVING MULTIPLE JOINTS: ICD-10-CM

## 2024-12-20 DIAGNOSIS — E78.2 MIXED HYPERLIPIDEMIA: ICD-10-CM

## 2024-12-20 DIAGNOSIS — E03.9 ACQUIRED HYPOTHYROIDISM: ICD-10-CM

## 2024-12-20 DIAGNOSIS — H81.09 VERTIGO, LABYRINTHINE, UNSPECIFIED LATERALITY: ICD-10-CM

## 2024-12-20 DIAGNOSIS — R53.83 FATIGUE, UNSPECIFIED TYPE: Primary | ICD-10-CM

## 2024-12-20 LAB
25(OH)D3 SERPL-MCNC: 19.1 NG/ML
ALBUMIN SERPL-MCNC: 4.5 G/DL (ref 3.4–5)
ALBUMIN/GLOB SERPL: 1.9 {RATIO} (ref 1.1–2.2)
ALP SERPL-CCNC: 70 U/L (ref 40–129)
ALT SERPL-CCNC: 16 U/L (ref 10–40)
ANION GAP SERPL CALCULATED.3IONS-SCNC: 12 MMOL/L (ref 3–16)
AST SERPL-CCNC: 23 U/L (ref 15–37)
BILIRUB SERPL-MCNC: 0.5 MG/DL (ref 0–1)
BUN SERPL-MCNC: 18 MG/DL (ref 7–20)
CALCIUM SERPL-MCNC: 9.5 MG/DL (ref 8.3–10.6)
CHLORIDE SERPL-SCNC: 101 MMOL/L (ref 99–110)
CHOLEST SERPL-MCNC: 220 MG/DL (ref 0–199)
CO2 SERPL-SCNC: 28 MMOL/L (ref 21–32)
CREAT SERPL-MCNC: 0.8 MG/DL (ref 0.6–1.2)
DEPRECATED RDW RBC AUTO: 14.2 % (ref 12.4–15.4)
FERRITIN SERPL IA-MCNC: 20.8 NG/ML (ref 15–150)
GFR SERPLBLD CREATININE-BSD FMLA CKD-EPI: 75 ML/MIN/{1.73_M2}
GLUCOSE SERPL-MCNC: 85 MG/DL (ref 70–99)
HCT VFR BLD AUTO: 41.7 % (ref 36–48)
HDLC SERPL-MCNC: 62 MG/DL (ref 40–60)
HGB BLD-MCNC: 13.6 G/DL (ref 12–16)
IRON SATN MFR SERPL: 29 % (ref 15–50)
IRON SERPL-MCNC: 83 UG/DL (ref 37–145)
LDLC SERPL CALC-MCNC: 125 MG/DL
MCH RBC QN AUTO: 30.8 PG (ref 26–34)
MCHC RBC AUTO-ENTMCNC: 32.7 G/DL (ref 31–36)
MCV RBC AUTO: 94.3 FL (ref 80–100)
PLATELET # BLD AUTO: 222 K/UL (ref 135–450)
PMV BLD AUTO: 9.9 FL (ref 5–10.5)
POTASSIUM SERPL-SCNC: 4.2 MMOL/L (ref 3.5–5.1)
PROT SERPL-MCNC: 6.9 G/DL (ref 6.4–8.2)
RBC # BLD AUTO: 4.42 M/UL (ref 4–5.2)
SODIUM SERPL-SCNC: 141 MMOL/L (ref 136–145)
T3 SERPL-MCNC: 0.97 NG/ML (ref 0.8–2)
T4 FREE SERPL-MCNC: 1.5 NG/DL (ref 0.9–1.8)
TIBC SERPL-MCNC: 291 UG/DL (ref 260–445)
TRIGL SERPL-MCNC: 167 MG/DL (ref 0–150)
TSH SERPL DL<=0.005 MIU/L-ACNC: 0.14 UIU/ML (ref 0.27–4.2)
VIT B12 SERPL-MCNC: 441 PG/ML (ref 211–911)
VLDLC SERPL CALC-MCNC: 33 MG/DL
WBC # BLD AUTO: 3.5 K/UL (ref 4–11)

## 2024-12-20 PROCEDURE — 3075F SYST BP GE 130 - 139MM HG: CPT | Performed by: INTERNAL MEDICINE

## 2024-12-20 PROCEDURE — 1159F MED LIST DOCD IN RCRD: CPT | Performed by: INTERNAL MEDICINE

## 2024-12-20 PROCEDURE — 3078F DIAST BP <80 MM HG: CPT | Performed by: INTERNAL MEDICINE

## 2024-12-20 PROCEDURE — 1123F ACP DISCUSS/DSCN MKR DOCD: CPT | Performed by: INTERNAL MEDICINE

## 2024-12-20 PROCEDURE — 1160F RVW MEDS BY RX/DR IN RCRD: CPT | Performed by: INTERNAL MEDICINE

## 2024-12-20 PROCEDURE — 99214 OFFICE O/P EST MOD 30 MIN: CPT | Performed by: INTERNAL MEDICINE

## 2024-12-20 ASSESSMENT — ENCOUNTER SYMPTOMS
NAUSEA: 0
CHEST TIGHTNESS: 0
WHEEZING: 0
VOMITING: 0
BACK PAIN: 0
CONSTIPATION: 0
SORE THROAT: 0
SHORTNESS OF BREATH: 0
COLOR CHANGE: 0
COUGH: 0
ABDOMINAL PAIN: 0

## 2024-12-20 NOTE — ASSESSMENT & PLAN NOTE
reporting symptoms with morning stiffness.  Tylenol usually good satisfactory relief, advised to continue as needed Tylenol, once feeling better and energy level improved can attempt daily stretches and core strengthening exercises in the morning to help with joint stiffness

## 2024-12-20 NOTE — PROGRESS NOTES
ASSESSMENT/PLAN:  1. Fatigue, unspecified type  Assessment & Plan:    patient reporting feeling fatigued and lack of energy over the past 2 to 3 months, there has been 10 pounds weight loss from her last visit, reports she has gluten sensitivity and the gluten-free diet that she is being served at the nursing facility is not appealing at all and many times she is not eating because she does not like what they are serving her.   lives in the same facility but in the independent living part, reports he will try and see if able to get her different food that she can enjoy.  Will update labs to rule out any electrolyte disturbances or nutritional deficiencies, encouraged adequate hydration, advised should also consider underlying depression and adjustment disorder since she is still trying to adjust to her new living situation although she reports it has gotten much better since she first moved in.  Will reevaluate once lab results available  Orders:  -     CBC; Future  -     Comprehensive Metabolic Panel; Future  -     Lipid Panel; Future  -     Vitamin B12; Future  -     Ferritin; Future  -     Iron and TIBC; Future  -     Vitamin D 25 Hydroxy; Future  2. Acquired hypothyroidism  Assessment & Plan:  Will update labs and adjust levothyroxine dose pending results, patient reports compliance with taking medications regularly   Orders:  -     TSH reflex to FT4,FT3; Future  3. Primary hypertension  Assessment & Plan:    blood pressure stable on low-dose of Lasix, continue same and update labs this visit  Orders:  -     CBC; Future  -     Comprehensive Metabolic Panel; Future  4. Vertigo, labyrinthine, unspecified laterality  Assessment & Plan:    patient discontinued meclizine on her own, recent CT imaging of the head completed in the emergency room that did not show any significant abnormalities from baseline.  5. Cognitive impairment  Assessment & Plan:  Patient is a resident at memory unit [facility]

## 2024-12-20 NOTE — ASSESSMENT & PLAN NOTE
patient discontinued meclizine on her own, recent CT imaging of the head completed in the emergency room that did not show any significant abnormalities from baseline.

## 2024-12-20 NOTE — ASSESSMENT & PLAN NOTE
patient reporting feeling fatigued and lack of energy over the past 2 to 3 months, there has been 10 pounds weight loss from her last visit, reports she has gluten sensitivity and the gluten-free diet that she is being served at the nursing facility is not appealing at all and many times she is not eating because she does not like what they are serving her.   lives in the same facility but in the independent living part, reports he will try and see if able to get her different food that she can enjoy.  Will update labs to rule out any electrolyte disturbances or nutritional deficiencies, encouraged adequate hydration, advised should also consider underlying depression and adjustment disorder since she is still trying to adjust to her new living situation although she reports it has gotten much better since she first moved in.  Will reevaluate once lab results available

## 2024-12-20 NOTE — ASSESSMENT & PLAN NOTE
Will update labs and adjust levothyroxine dose pending results, patient reports compliance with taking medications regularly

## 2024-12-23 DIAGNOSIS — E03.9 ACQUIRED HYPOTHYROIDISM: ICD-10-CM

## 2024-12-23 DIAGNOSIS — E55.9 VITAMIN D DEFICIENCY: Primary | ICD-10-CM

## 2024-12-23 RX ORDER — ERGOCALCIFEROL 1.25 MG/1
50000 CAPSULE, LIQUID FILLED ORAL WEEKLY
Qty: 12 CAPSULE | Refills: 3 | Status: SHIPPED | OUTPATIENT
Start: 2024-12-23

## 2025-01-07 ENCOUNTER — PATIENT MESSAGE (OUTPATIENT)
Dept: INTERNAL MEDICINE CLINIC | Age: 80
End: 2025-01-07

## 2025-03-14 ENCOUNTER — OFFICE VISIT (OUTPATIENT)
Dept: INTERNAL MEDICINE CLINIC | Age: 80
End: 2025-03-14
Payer: MEDICARE

## 2025-03-14 VITALS
SYSTOLIC BLOOD PRESSURE: 124 MMHG | OXYGEN SATURATION: 98 % | BODY MASS INDEX: 18.04 KG/M2 | DIASTOLIC BLOOD PRESSURE: 78 MMHG | WEIGHT: 108.4 LBS | HEART RATE: 68 BPM

## 2025-03-14 DIAGNOSIS — R53.1 GENERALIZED WEAKNESS: ICD-10-CM

## 2025-03-14 DIAGNOSIS — E55.9 VITAMIN D DEFICIENCY: ICD-10-CM

## 2025-03-14 DIAGNOSIS — E03.9 ACQUIRED HYPOTHYROIDISM: ICD-10-CM

## 2025-03-14 DIAGNOSIS — E03.9 ACQUIRED HYPOTHYROIDISM: Primary | ICD-10-CM

## 2025-03-14 DIAGNOSIS — I10 PRIMARY HYPERTENSION: ICD-10-CM

## 2025-03-14 DIAGNOSIS — H81.09 VERTIGO, LABYRINTHINE, UNSPECIFIED LATERALITY: ICD-10-CM

## 2025-03-14 DIAGNOSIS — R53.82 CHRONIC FATIGUE: ICD-10-CM

## 2025-03-14 LAB
25(OH)D3 SERPL-MCNC: 30.1 NG/ML
ALBUMIN SERPL-MCNC: 4.6 G/DL (ref 3.4–5)
ALBUMIN/GLOB SERPL: 1.8 {RATIO} (ref 1.1–2.2)
ALP SERPL-CCNC: 54 U/L (ref 40–129)
ALT SERPL-CCNC: 14 U/L (ref 10–40)
ANION GAP SERPL CALCULATED.3IONS-SCNC: 10 MMOL/L (ref 3–16)
AST SERPL-CCNC: 23 U/L (ref 15–37)
BILIRUB SERPL-MCNC: 0.5 MG/DL (ref 0–1)
BUN SERPL-MCNC: 17 MG/DL (ref 7–20)
CALCIUM SERPL-MCNC: 9.4 MG/DL (ref 8.3–10.6)
CHLORIDE SERPL-SCNC: 98 MMOL/L (ref 99–110)
CO2 SERPL-SCNC: 28 MMOL/L (ref 21–32)
CREAT SERPL-MCNC: 0.9 MG/DL (ref 0.6–1.2)
GFR SERPLBLD CREATININE-BSD FMLA CKD-EPI: 65 ML/MIN/{1.73_M2}
GLUCOSE SERPL-MCNC: 89 MG/DL (ref 70–99)
POTASSIUM SERPL-SCNC: 3.8 MMOL/L (ref 3.5–5.1)
PROT SERPL-MCNC: 7.2 G/DL (ref 6.4–8.2)
SODIUM SERPL-SCNC: 136 MMOL/L (ref 136–145)
T4 FREE SERPL-MCNC: 1.9 NG/DL (ref 0.9–1.8)
TSH SERPL DL<=0.005 MIU/L-ACNC: 0.18 UIU/ML (ref 0.27–4.2)

## 2025-03-14 PROCEDURE — 3074F SYST BP LT 130 MM HG: CPT | Performed by: INTERNAL MEDICINE

## 2025-03-14 PROCEDURE — 3078F DIAST BP <80 MM HG: CPT | Performed by: INTERNAL MEDICINE

## 2025-03-14 PROCEDURE — 1160F RVW MEDS BY RX/DR IN RCRD: CPT | Performed by: INTERNAL MEDICINE

## 2025-03-14 PROCEDURE — 1159F MED LIST DOCD IN RCRD: CPT | Performed by: INTERNAL MEDICINE

## 2025-03-14 PROCEDURE — 1123F ACP DISCUSS/DSCN MKR DOCD: CPT | Performed by: INTERNAL MEDICINE

## 2025-03-14 PROCEDURE — 99214 OFFICE O/P EST MOD 30 MIN: CPT | Performed by: INTERNAL MEDICINE

## 2025-03-14 SDOH — ECONOMIC STABILITY: FOOD INSECURITY: WITHIN THE PAST 12 MONTHS, THE FOOD YOU BOUGHT JUST DIDN'T LAST AND YOU DIDN'T HAVE MONEY TO GET MORE.: NEVER TRUE

## 2025-03-14 SDOH — ECONOMIC STABILITY: FOOD INSECURITY: WITHIN THE PAST 12 MONTHS, YOU WORRIED THAT YOUR FOOD WOULD RUN OUT BEFORE YOU GOT MONEY TO BUY MORE.: NEVER TRUE

## 2025-03-14 ASSESSMENT — ENCOUNTER SYMPTOMS
SHORTNESS OF BREATH: 0
SORE THROAT: 0
COUGH: 0
VOMITING: 0
BACK PAIN: 0
CHEST TIGHTNESS: 0
ABDOMINAL PAIN: 0
CONSTIPATION: 0
COLOR CHANGE: 0
WHEEZING: 0
SINUS PRESSURE: 0
NAUSEA: 0

## 2025-03-14 ASSESSMENT — PATIENT HEALTH QUESTIONNAIRE - PHQ9
SUM OF ALL RESPONSES TO PHQ QUESTIONS 1-9: 0
SUM OF ALL RESPONSES TO PHQ QUESTIONS 1-9: 0
2. FEELING DOWN, DEPRESSED OR HOPELESS: NOT AT ALL
SUM OF ALL RESPONSES TO PHQ QUESTIONS 1-9: 0
SUM OF ALL RESPONSES TO PHQ QUESTIONS 1-9: 0
1. LITTLE INTEREST OR PLEASURE IN DOING THINGS: NOT AT ALL

## 2025-03-14 NOTE — ASSESSMENT & PLAN NOTE
Encouraged to get lab work updated as her TSH was suboptimal last visit and she never rechecked the follow-up labs, also patient has not been consistent taking levothyroxine first thing in the morning on its own, reports she gets medications given to her by nursing staff and she is not sure if this has been the case.(Patient with cognitive decline and memory problems).  Advised  will update labs and adjust levothyroxine dose pending results and then will send specific instructions to the staff at the facility where she resides for medication administration

## 2025-03-14 NOTE — PROGRESS NOTES
ASSESSMENT/PLAN:  1. Acquired hypothyroidism  Assessment & Plan:  Encouraged to get lab work updated as her TSH was suboptimal last visit and she never rechecked the follow-up labs, also patient has not been consistent taking levothyroxine first thing in the morning on its own, reports she gets medications given to her by nursing staff and she is not sure if this has been the case.(Patient with cognitive decline and memory problems).  Advised  will update labs and adjust levothyroxine dose pending results and then will send specific instructions to the staff at the facility where she resides for medication administration   2. Vertigo, labyrinthine, unspecified laterality  Assessment & Plan:  Previously worked up and negative imaging, no longer taking meclizine because she does not feel it helps, discussed with  either referral to ENT or doing a trial of vestibular and balance rehab with physical therapy, he is able to drive her and will try that to see if it will help with the symptoms.  Encouraged adequate water intake   Orders:  -     [facility] - [facility]  3. Primary hypertension  Assessment & Plan:  Blood pressure stable, continue current dose of Lasix   4. Chronic fatigue  Assessment & Plan:  Patient reports she was diagnosed with chronic fatigue syndrome previously, this has been progressively getting worse, she is also progressively losing weight even though she reports her appetite is good,  reports otherwise and reports she is mostly sedentary and refused to participate in any activities.  Labs recently checked were okay except for TSH, will update labs, advised to start adding protein shakes or protein supplement to her current diet, she does have gluten sensitivity, recommend also starting multi oral vitamins with minerals   Orders:  -     CBC; Future  -     Comprehensive Metabolic Panel; Future  5. Vitamin D deficiency  Assessment & Plan:  Severe deficiency,

## 2025-03-14 NOTE — ASSESSMENT & PLAN NOTE
Patient reports she was diagnosed with chronic fatigue syndrome previously, this has been progressively getting worse, she is also progressively losing weight even though she reports her appetite is good,  reports otherwise and reports she is mostly sedentary and refused to participate in any activities.  Labs recently checked were okay except for TSH, will update labs, advised to start adding protein shakes or protein supplement to her current diet, she does have gluten sensitivity, recommend also starting multi oral vitamins with minerals

## 2025-03-14 NOTE — ASSESSMENT & PLAN NOTE
Previously worked up and negative imaging, no longer taking meclizine because she does not feel it helps, discussed with  either referral to ENT or doing a trial of vestibular and balance rehab with physical therapy, he is able to drive her and will try that to see if it will help with the symptoms.  Encouraged adequate water intake

## 2025-03-14 NOTE — ASSESSMENT & PLAN NOTE
Severe deficiency, was started on high-dose replacement therapy few weeks back and reports she has been taking that.  Will check levels to ensure improvement

## 2025-03-15 LAB
DEPRECATED RDW RBC AUTO: 14.7 % (ref 12.4–15.4)
HCT VFR BLD AUTO: 44.7 % (ref 36–48)
HGB BLD-MCNC: 14.8 G/DL (ref 12–16)
MCH RBC QN AUTO: 31.2 PG (ref 26–34)
MCHC RBC AUTO-ENTMCNC: 33.1 G/DL (ref 31–36)
MCV RBC AUTO: 94.2 FL (ref 80–100)
PLATELET # BLD AUTO: 222 K/UL (ref 135–450)
PMV BLD AUTO: 9.7 FL (ref 5–10.5)
RBC # BLD AUTO: 4.75 M/UL (ref 4–5.2)
WBC # BLD AUTO: 2.9 K/UL (ref 4–11)

## 2025-03-17 ENCOUNTER — RESULTS FOLLOW-UP (OUTPATIENT)
Dept: INTERNAL MEDICINE CLINIC | Age: 80
End: 2025-03-17

## 2025-03-17 DIAGNOSIS — E03.9 ACQUIRED HYPOTHYROIDISM: Primary | ICD-10-CM

## 2025-03-17 RX ORDER — LEVOTHYROXINE SODIUM 50 UG/1
50 TABLET ORAL DAILY
Qty: 90 TABLET | Refills: 1 | Status: SHIPPED | OUTPATIENT
Start: 2025-03-17 | End: 2025-03-17 | Stop reason: SDUPTHER

## 2025-03-17 RX ORDER — LEVOTHYROXINE SODIUM 50 UG/1
50 TABLET ORAL DAILY
Qty: 90 TABLET | Refills: 1 | Status: SHIPPED | OUTPATIENT
Start: 2025-03-17 | End: 2025-04-09

## 2025-03-20 ENCOUNTER — PATIENT MESSAGE (OUTPATIENT)
Dept: INTERNAL MEDICINE CLINIC | Age: 80
End: 2025-03-20

## 2025-04-01 ENCOUNTER — OFFICE VISIT (OUTPATIENT)
Dept: INTERNAL MEDICINE CLINIC | Age: 80
End: 2025-04-01
Payer: MEDICARE

## 2025-04-01 VITALS
DIASTOLIC BLOOD PRESSURE: 68 MMHG | HEIGHT: 65 IN | OXYGEN SATURATION: 99 % | BODY MASS INDEX: 17.73 KG/M2 | TEMPERATURE: 98.1 F | SYSTOLIC BLOOD PRESSURE: 134 MMHG | WEIGHT: 106.4 LBS | HEART RATE: 67 BPM

## 2025-04-01 DIAGNOSIS — R19.7 DIARRHEA, UNSPECIFIED TYPE: ICD-10-CM

## 2025-04-01 DIAGNOSIS — A08.4 VIRAL GASTROENTERITIS: Primary | ICD-10-CM

## 2025-04-01 PROBLEM — R11.0 NAUSEA: Status: ACTIVE | Noted: 2025-04-01

## 2025-04-01 LAB
BILIRUBIN, POC: NORMAL
BLOOD URINE, POC: NEGATIVE
CLARITY, POC: CLEAR
COLOR, POC: YELLOW
GLUCOSE URINE, POC: NEGATIVE MG/DL
KETONES, POC: NORMAL MG/DL
LEUKOCYTE EST, POC: NEGATIVE
NITRITE, POC: NEGATIVE
PH, POC: 7
PROTEIN, POC: 30 MG/DL
SPECIFIC GRAVITY, POC: 1015
UROBILINOGEN, POC: 1 MG/DL

## 2025-04-01 PROCEDURE — 3078F DIAST BP <80 MM HG: CPT | Performed by: NURSE PRACTITIONER

## 2025-04-01 PROCEDURE — 1159F MED LIST DOCD IN RCRD: CPT | Performed by: NURSE PRACTITIONER

## 2025-04-01 PROCEDURE — 81002 URINALYSIS NONAUTO W/O SCOPE: CPT | Performed by: NURSE PRACTITIONER

## 2025-04-01 PROCEDURE — 99213 OFFICE O/P EST LOW 20 MIN: CPT | Performed by: NURSE PRACTITIONER

## 2025-04-01 PROCEDURE — 1123F ACP DISCUSS/DSCN MKR DOCD: CPT | Performed by: NURSE PRACTITIONER

## 2025-04-01 PROCEDURE — 1160F RVW MEDS BY RX/DR IN RCRD: CPT | Performed by: NURSE PRACTITIONER

## 2025-04-01 PROCEDURE — 3075F SYST BP GE 130 - 139MM HG: CPT | Performed by: NURSE PRACTITIONER

## 2025-04-01 ASSESSMENT — ENCOUNTER SYMPTOMS
CONSTIPATION: 0
ABDOMINAL PAIN: 1
DIARRHEA: 1
VOMITING: 0
NAUSEA: 0
BLOOD IN STOOL: 0

## 2025-04-01 NOTE — PROGRESS NOTES
Office Visit   4/1/2025    Assessment and Plan:  1. Viral gastroenteritis  2. Diarrhea, unspecified type     Assessment & Plan:  acute, new problem.  Vital signs are stable.  intolerable to Imodium, caused her to be disoriented.  recommend Pepto-Bismol as directed.  encouraged hydration with electrolytes such as Gatorade and bland diet  Discussed signs of dehydration-decreased urinary output, lethargic, increased confusion, unable to eat or drink or develops fever or chills, she is to go to the ED.  Patient and  understood and agreed to treatment plan.  Orders:  -     POCT Urinalysis no Micro- negative  -     Culture, Urine    Return in about 1 week (around 4/8/2025) for with Dr. Garza .     Subjective:  Chief Complaint   Patient presents with    Diarrhea     Diarrhea and nausea x 14 days  Has been taking Meclizine to help        HPI:   Sheila Sierra is a 79 y.o. female who presents to the clinic today for acute visit.    Patient presents with her .  Patient lives at a memory care facility.    Patient presents with diarrhea for 10 days.  She reports 5 episodes of intermittent sharp abdominal pain-doesn't last long and resolves on its own.  She is having loose stools but could not remember how many per day.   states that she had 4 loose stools today.  She has decreased appetite.  She is afraid she will have diarrhea if she eats.  She reports having cramping and diarrhea after she eats but not all the time.  She can have diarrhea without eating.  Denies fever or chills, constipation, vomiting, dizziness, bloody stools or belching.  She has frequent urination.  Nausea has resolved.  She reports drinking a lot of water.  She states her  had similar symptoms 2 to 3 weeks ago.  She states her care facility gave her Imodium 2 days ago which stopped the diarrhea but made her disoriented.  She reports diarrhea came back today.    Review of Systems   Constitutional:  Positive for appetite

## 2025-04-02 ENCOUNTER — RESULTS FOLLOW-UP (OUTPATIENT)
Dept: INTERNAL MEDICINE CLINIC | Age: 80
End: 2025-04-02

## 2025-04-02 LAB — BACTERIA UR CULT: NORMAL

## 2025-04-03 NOTE — ASSESSMENT & PLAN NOTE
acute, new problem.    intolerable to Imodium as it caused her to be disoriented.  recommend Pepto-Bismol as directed.  encouraged hydration with electrolytes such as Gatorade and bland diet  Discussed signs of dehydration-decreased urinary output, lethargic, increased confusion, unable to eat or drink or dry mouth, she is to go to the ED.  Patient and has been understood and agreed to treatment plan.

## 2025-04-08 ENCOUNTER — OFFICE VISIT (OUTPATIENT)
Dept: INTERNAL MEDICINE CLINIC | Age: 80
End: 2025-04-08
Payer: MEDICARE

## 2025-04-08 VITALS
WEIGHT: 109.8 LBS | SYSTOLIC BLOOD PRESSURE: 134 MMHG | HEART RATE: 56 BPM | OXYGEN SATURATION: 99 % | BODY MASS INDEX: 18.27 KG/M2 | DIASTOLIC BLOOD PRESSURE: 78 MMHG

## 2025-04-08 DIAGNOSIS — K90.41 GLUTEN-SENSITIVE ENTEROPATHY: ICD-10-CM

## 2025-04-08 DIAGNOSIS — R19.7 DIARRHEA, UNSPECIFIED TYPE: ICD-10-CM

## 2025-04-08 DIAGNOSIS — E03.9 ACQUIRED HYPOTHYROIDISM: ICD-10-CM

## 2025-04-08 DIAGNOSIS — I10 PRIMARY HYPERTENSION: Primary | ICD-10-CM

## 2025-04-08 DIAGNOSIS — R53.83 FATIGUE, UNSPECIFIED TYPE: ICD-10-CM

## 2025-04-08 PROBLEM — A08.4 VIRAL GASTROENTERITIS: Status: RESOLVED | Noted: 2025-04-01 | Resolved: 2025-04-08

## 2025-04-08 PROBLEM — R11.0 NAUSEA: Status: RESOLVED | Noted: 2025-04-01 | Resolved: 2025-04-08

## 2025-04-08 PROCEDURE — 3078F DIAST BP <80 MM HG: CPT | Performed by: INTERNAL MEDICINE

## 2025-04-08 PROCEDURE — 3075F SYST BP GE 130 - 139MM HG: CPT | Performed by: INTERNAL MEDICINE

## 2025-04-08 PROCEDURE — 99214 OFFICE O/P EST MOD 30 MIN: CPT | Performed by: INTERNAL MEDICINE

## 2025-04-08 PROCEDURE — 1160F RVW MEDS BY RX/DR IN RCRD: CPT | Performed by: INTERNAL MEDICINE

## 2025-04-08 PROCEDURE — 1123F ACP DISCUSS/DSCN MKR DOCD: CPT | Performed by: INTERNAL MEDICINE

## 2025-04-08 PROCEDURE — 1159F MED LIST DOCD IN RCRD: CPT | Performed by: INTERNAL MEDICINE

## 2025-04-08 RX ORDER — PSYLLIUM HUSK 100 %
1 POWDER (GRAM) MISCELLANEOUS DAILY
Qty: 1 EACH | Refills: 0 | Status: SHIPPED | OUTPATIENT
Start: 2025-04-08

## 2025-04-08 ASSESSMENT — ENCOUNTER SYMPTOMS
CONSTIPATION: 0
BACK PAIN: 0
NAUSEA: 1
CHEST TIGHTNESS: 0
WHEEZING: 0
VOMITING: 0
COUGH: 0
ABDOMINAL PAIN: 0
COLOR CHANGE: 0
SORE THROAT: 0
SHORTNESS OF BREATH: 0
DIARRHEA: 1
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
## 2025-04-08 NOTE — PROGRESS NOTES
ASSESSMENT/PLAN:  1. Primary hypertension  Assessment & Plan:  Blood pressure stable, continue same and reevaluate in 3 months or sooner if needed   2. Gluten-sensitive enteropathy  Assessment & Plan:  Patient was diagnosed in 2007, at the time she refused small bowel biopsy but had significant relief of symptoms when went gluten-free.  She continues to find it challenging to stay completely gluten-free, she currently lives in assistant living and has no control over her diet although they have been working with her to keep her on gluten-free diet she sometimes get cross-contamination.  Not sure if recent bout of diarrhea was related to her underlying gluten sensitivity or not, she wants to see gastroenterology for further evaluation.  She will be meeting with the dietitian at the [facility] living facility where she resides to help with her diet planning and will place referral to GI.   Orders:  -     Psyllium Husk POWD; Take 1 Capful by mouth daily, Disp-1 each, RAnibal Gibson MD, Gastroenterology, [facility]  3. Diarrhea, unspecified type  Assessment & Plan:  Improved some, no evidence of dehydration on exam, she has been using meclizine for the dizziness which also has been helpful with nausea, advised to continue same, recommended she starts bulk forming agent as psyllium husk to help with symptoms     Orders:  -     Psyllium Husk POWD; Take 1 Capful by mouth daily, Disp-1 each, R-0Anibal Huber MD, Gastroenterology, [facility]  4. Fatigue, unspecified type  5. Acquired hypothyroidism      Return in about 3 months (around 7/8/2025) for AWV.     SUBJECTIVE  HPI:   Patient seen in office by nurse practitioner a week ago with acute diarrhea, diagnosed with viral gastroenteritis,  reports he had the flu and thinks that is what caused her symptoms.  Her acute diarrhea improved although her stool continues to be on the loose side, she continues to be

## 2025-04-08 NOTE — ASSESSMENT & PLAN NOTE
Improved some, no evidence of dehydration on exam, she has been using meclizine for the dizziness which also has been helpful with nausea, advised to continue same, recommended she starts bulk forming agent as psyllium husk to help with symptoms

## 2025-04-09 DIAGNOSIS — Z79.890 HORMONE REPLACEMENT THERAPY: ICD-10-CM

## 2025-04-09 DIAGNOSIS — E03.9 ACQUIRED HYPOTHYROIDISM: ICD-10-CM

## 2025-04-09 DIAGNOSIS — N95.2 ATROPHIC VAGINITIS: ICD-10-CM

## 2025-04-09 DIAGNOSIS — I10 PRIMARY HYPERTENSION: ICD-10-CM

## 2025-04-09 RX ORDER — LEVOTHYROXINE SODIUM 50 UG/1
50 TABLET ORAL DAILY
Qty: 90 TABLET | Refills: 1 | Status: SHIPPED | OUTPATIENT
Start: 2025-04-09

## 2025-04-09 RX ORDER — ESTRADIOL 0.5 MG/1
0.5 TABLET ORAL DAILY
Qty: 90 TABLET | Refills: 1 | Status: SHIPPED | OUTPATIENT
Start: 2025-04-09

## 2025-04-09 RX ORDER — FUROSEMIDE 20 MG/1
20 TABLET ORAL DAILY
Qty: 90 TABLET | Refills: 1 | Status: SHIPPED | OUTPATIENT
Start: 2025-04-09

## 2025-04-13 ENCOUNTER — PATIENT MESSAGE (OUTPATIENT)
Dept: INTERNAL MEDICINE CLINIC | Age: 80
End: 2025-04-13

## 2025-04-13 DIAGNOSIS — E55.9 VITAMIN D DEFICIENCY: ICD-10-CM

## 2025-04-14 RX ORDER — ERGOCALCIFEROL 1.25 MG/1
50000 CAPSULE, LIQUID FILLED ORAL WEEKLY
Qty: 12 CAPSULE | Refills: 3 | Status: SHIPPED | OUTPATIENT
Start: 2025-04-14

## 2025-04-17 ENCOUNTER — TELEPHONE (OUTPATIENT)
Dept: INTERNAL MEDICINE CLINIC | Age: 80
End: 2025-04-17

## 2025-04-18 NOTE — TELEPHONE ENCOUNTER
The medication is APPROVED THRU 04/18/2026    If this requires a response please respond to the pool ( P [facility] MEDICATION PRE-AUTH).      Thank you please advise patient.

## 2025-04-18 NOTE — TELEPHONE ENCOUNTER
Submitted PA for   estradiol (ESTRACE) 0.5 MG tablet     Via CM (Key: [id number]) STATUS: PENDING.    Follow up done daily; if no decision with in three days we will refax.  If another three days goes by with no decision will call the insurance for status.

## 2025-05-15 ENCOUNTER — TELEPHONE (OUTPATIENT)
Dept: INTERNAL MEDICINE CLINIC | Age: 80
End: 2025-05-15

## 2025-05-15 NOTE — TELEPHONE ENCOUNTER
Amparo from [facility] calling in.    Pt has order for Psyllium powder daily and pt has been refusing that. Amparo is wanting to know if that can be changed to PRN daily or just discontinue. Pt is have normal Bms, no issues currently.    Amparo may be reached at [phone].

## 2025-05-16 DIAGNOSIS — E03.9 ACQUIRED HYPOTHYROIDISM: ICD-10-CM

## 2025-05-16 LAB
T4 FREE SERPL-MCNC: 0.9 NG/DL (ref 0.9–1.8)
TSH SERPL DL<=0.005 MIU/L-ACNC: 4.94 UIU/ML (ref 0.27–4.2)

## 2025-05-19 ENCOUNTER — RESULTS FOLLOW-UP (OUTPATIENT)
Dept: INTERNAL MEDICINE CLINIC | Age: 80
End: 2025-05-19

## 2025-06-24 ENCOUNTER — HOSPITAL ENCOUNTER (OUTPATIENT)
Dept: GENERAL RADIOLOGY | Age: 80
Discharge: HOME | End: 2025-06-24
Payer: MEDICARE

## 2025-06-24 ENCOUNTER — HOSPITAL ENCOUNTER (OUTPATIENT)
Age: 80
Discharge: HOME | End: 2025-06-24
Payer: MEDICARE

## 2025-06-24 DIAGNOSIS — R10.84 ABDOMINAL PAIN, GENERALIZED: ICD-10-CM

## 2025-06-24 LAB — IGA SERPL-MCNC: 213 MG/DL (ref 70–400)

## 2025-06-24 PROCEDURE — 82784 ASSAY IGA/IGD/IGG/IGM EACH: CPT

## 2025-06-24 PROCEDURE — 74018 RADEX ABDOMEN 1 VIEW: CPT

## 2025-06-24 PROCEDURE — 83516 IMMUNOASSAY NONANTIBODY: CPT

## 2025-06-24 PROCEDURE — 36415 COLL VENOUS BLD VENIPUNCTURE: CPT

## 2025-06-25 LAB — TISSUE TRANSGLUTAMINASE IGA: <0.5 U/ML (ref 0–14)

## 2025-07-05 SDOH — HEALTH STABILITY: PHYSICAL HEALTH: ON AVERAGE, HOW MANY MINUTES DO YOU ENGAGE IN EXERCISE AT THIS LEVEL?: 0 MIN

## 2025-07-05 SDOH — HEALTH STABILITY: PHYSICAL HEALTH: ON AVERAGE, HOW MANY DAYS PER WEEK DO YOU ENGAGE IN MODERATE TO STRENUOUS EXERCISE (LIKE A BRISK WALK)?: 4 DAYS

## 2025-07-05 ASSESSMENT — PATIENT HEALTH QUESTIONNAIRE - PHQ9
SUM OF ALL RESPONSES TO PHQ QUESTIONS 1-9: 15
3. TROUBLE FALLING OR STAYING ASLEEP: NEARLY EVERY DAY
5. POOR APPETITE OR OVEREATING: NEARLY EVERY DAY
SUM OF ALL RESPONSES TO PHQ QUESTIONS 1-9: 15
10. IF YOU CHECKED OFF ANY PROBLEMS, HOW DIFFICULT HAVE THESE PROBLEMS MADE IT FOR YOU TO DO YOUR WORK, TAKE CARE OF THINGS AT HOME, OR GET ALONG WITH OTHER PEOPLE: NOT DIFFICULT AT ALL
9. THOUGHTS THAT YOU WOULD BE BETTER OFF DEAD, OR OF HURTING YOURSELF: NOT AT ALL
7. TROUBLE CONCENTRATING ON THINGS, SUCH AS READING THE NEWSPAPER OR WATCHING TELEVISION: NOT AT ALL
2. FEELING DOWN, DEPRESSED OR HOPELESS: SEVERAL DAYS
4. FEELING TIRED OR HAVING LITTLE ENERGY: NEARLY EVERY DAY
8. MOVING OR SPEAKING SO SLOWLY THAT OTHER PEOPLE COULD HAVE NOTICED. OR THE OPPOSITE, BEING SO FIGETY OR RESTLESS THAT YOU HAVE BEEN MOVING AROUND A LOT MORE THAN USUAL: MORE THAN HALF THE DAYS
1. LITTLE INTEREST OR PLEASURE IN DOING THINGS: NEARLY EVERY DAY
6. FEELING BAD ABOUT YOURSELF - OR THAT YOU ARE A FAILURE OR HAVE LET YOURSELF OR YOUR FAMILY DOWN: NOT AT ALL

## 2025-07-05 ASSESSMENT — LIFESTYLE VARIABLES
HOW MANY STANDARD DRINKS CONTAINING ALCOHOL DO YOU HAVE ON A TYPICAL DAY: PATIENT DOES NOT DRINK
HOW OFTEN DO YOU HAVE A DRINK CONTAINING ALCOHOL: 1
HOW OFTEN DO YOU HAVE SIX OR MORE DRINKS ON ONE OCCASION: 1
HOW MANY STANDARD DRINKS CONTAINING ALCOHOL DO YOU HAVE ON A TYPICAL DAY: 0
HOW OFTEN DO YOU HAVE A DRINK CONTAINING ALCOHOL: NEVER

## 2025-07-08 ENCOUNTER — OFFICE VISIT (OUTPATIENT)
Dept: INTERNAL MEDICINE CLINIC | Age: 80
End: 2025-07-08
Payer: MEDICARE

## 2025-07-08 VITALS
SYSTOLIC BLOOD PRESSURE: 118 MMHG | OXYGEN SATURATION: 98 % | HEART RATE: 93 BPM | WEIGHT: 116.6 LBS | DIASTOLIC BLOOD PRESSURE: 72 MMHG | BODY MASS INDEX: 19.4 KG/M2

## 2025-07-08 DIAGNOSIS — I10 PRIMARY HYPERTENSION: ICD-10-CM

## 2025-07-08 DIAGNOSIS — Z00.00 MEDICARE ANNUAL WELLNESS VISIT, SUBSEQUENT: Primary | ICD-10-CM

## 2025-07-08 DIAGNOSIS — E03.9 ACQUIRED HYPOTHYROIDISM: ICD-10-CM

## 2025-07-08 DIAGNOSIS — M81.0 AGE-RELATED OSTEOPOROSIS WITHOUT CURRENT PATHOLOGICAL FRACTURE: ICD-10-CM

## 2025-07-08 DIAGNOSIS — N95.2 ATROPHIC VAGINITIS: ICD-10-CM

## 2025-07-08 DIAGNOSIS — R53.82 CHRONIC FATIGUE: ICD-10-CM

## 2025-07-08 DIAGNOSIS — Z79.890 HORMONE REPLACEMENT THERAPY: ICD-10-CM

## 2025-07-08 PROBLEM — R19.7 DIARRHEA: Status: RESOLVED | Noted: 2025-04-01 | Resolved: 2025-07-08

## 2025-07-08 PROCEDURE — 99214 OFFICE O/P EST MOD 30 MIN: CPT | Performed by: INTERNAL MEDICINE

## 2025-07-08 PROCEDURE — 1123F ACP DISCUSS/DSCN MKR DOCD: CPT | Performed by: INTERNAL MEDICINE

## 2025-07-08 PROCEDURE — 1159F MED LIST DOCD IN RCRD: CPT | Performed by: INTERNAL MEDICINE

## 2025-07-08 PROCEDURE — 1160F RVW MEDS BY RX/DR IN RCRD: CPT | Performed by: INTERNAL MEDICINE

## 2025-07-08 PROCEDURE — 3074F SYST BP LT 130 MM HG: CPT | Performed by: INTERNAL MEDICINE

## 2025-07-08 PROCEDURE — 3078F DIAST BP <80 MM HG: CPT | Performed by: INTERNAL MEDICINE

## 2025-07-08 PROCEDURE — G0439 PPPS, SUBSEQ VISIT: HCPCS | Performed by: INTERNAL MEDICINE

## 2025-07-08 ASSESSMENT — ENCOUNTER SYMPTOMS
COLOR CHANGE: 0
CHEST TIGHTNESS: 0
DIARRHEA: 1
NAUSEA: 0
COUGH: 0
CONSTIPATION: 0
BACK PAIN: 0
WHEEZING: 0
SORE THROAT: 0
ABDOMINAL PAIN: 1
SHORTNESS OF BREATH: 0
VOMITING: 0

## 2025-07-08 NOTE — ASSESSMENT & PLAN NOTE
Previously diagnosed with osteoporosis, last year declined DEXA scan but now willing to complete that, reinforced recommendations to restart calcium supplements along with her weekly vitamin D supplements, advised once done with the weekly vitamin D course can switch to combined over-the-counter calcium with vitamin D supplements.  Continue attempts for daily weightbearing activity

## 2025-07-08 NOTE — ASSESSMENT & PLAN NOTE
Patient has been on hormone replacement therapy for long number of years, previously declined weaning of estrogen, now she is questioning why she is on it, she denies any history of recurrent urinary tract infections, she did have atrophic vaginitis but currently not sexually active and denies any symptoms, advised can try and start weaning by taking the pill every other day and then every third day and then once a week until she is finally off.   verbalized understanding

## 2025-07-08 NOTE — PROGRESS NOTES
all that apply: (!) (Patient-Rptd) Walking/Balance  Select all that apply: (!) (Patient-Rptd) Housekeeping, Banking/Finances, Telephone Use, Food Preparation, Transportation, Taking Medications  Interventions:  Patient resides in long-term facility and has meals and housekeeping provided      Personalized Preventive Plan   Current Health Maintenance Status  Immunization History   Administered Date(s) Administered    TD 5LF, TENIVAC, (age 7y+), IM, 0.5mL 08/15/2024      Health Maintenance   Topic Date Due    Shingles vaccine (1 of 2) Never done    Pneumococcal 50+ years Vaccine (1 of 1 - PCV) Never done    DEXA (modify frequency per FRAX score)  Never done    Respiratory Syncytial Virus (RSV) Pregnant or age 60 yrs+ (1 - 1-dose 75+ series) Never done    DTaP/Tdap/Td vaccine (1 - Tdap) 08/16/2024    COVID-19 Vaccine (1 - 2024-25 season) Never done    Flu vaccine (1) 08/01/2025    Depression Monitoring  07/05/2026    Annual Wellness Visit (Medicare)  07/09/2026    Hepatitis A vaccine  Aged Out    Hepatitis B vaccine  Aged Out    Hib vaccine  Aged Out    Polio vaccine  Aged Out    Meningococcal (ACWY) vaccine  Aged Out    Meningococcal B vaccine  Aged Out    Depression Screen  Discontinued    Hepatitis C screen  Discontinued     Recommendations for Preventive Services Due: see orders and patient instructions/AVS.    1. Medicare annual wellness visit, subsequent  2. Chronic fatigue  Assessment & Plan:  This is chronic, mostly worsened by deconditioning and sedentary lifestyle, labs checked few months back without any significant deficiencies other than vitamin D deficiency.  Again advised attempt and participate in the exercise programs and classes offered at the nursing facility where she resides.  Will update labs next visit   3. Hormone replacement therapy  Assessment & Plan:  Patient has been on hormone replacement therapy for long number of years, previously declined weaning of estrogen, now she is questioning why

## 2025-07-08 NOTE — ASSESSMENT & PLAN NOTE
This is chronic, mostly worsened by deconditioning and sedentary lifestyle, labs checked few months back without any significant deficiencies other than vitamin D deficiency.  Again advised attempt and participate in the exercise programs and classes offered at the nursing facility where she resides.  Will update labs next visit

## 2025-07-08 NOTE — ASSESSMENT & PLAN NOTE
Blood pressure remains stable , she is currently on Lasix 20 mg daily with good tolerance, continue same

## 2025-07-08 NOTE — ASSESSMENT & PLAN NOTE
Most recent thyroid function is at target goal on current dose of levothyroxine, continue same, will update labs next visit

## 2025-07-21 NOTE — PROGRESS NOTES
You  Sheila العلي now (10:32 AM)       Patient reached ____ yes  __x___ no         MY Chart message sent  _yes____  VM instructions left __x__ yes   phone number ________                                ____ no-office notified     [address] [place], [place]   [address]        Date __7/29/25_______  Time __1145_____  Arrival 1015___/per office___     Nothing to eat or drink after midnight-follow your doctors prep instructions-this may include taking a second dose of your prep after midnight     Responsible adult 18 or older to stay on site while you are here-drive you home-stay with you after     Dress comfortably-No makeup or jewlrey     Follow any instructions your doctors office has given you     Bring a complete list of all your medications and supplements including name,dose,how often taken the day of your procedure      If you normally take the following medications in the morning please do so the AM of your procedure with a small sip of water       Heart,blood pressure,seizure,thyroid or breathing medications-use your inhalers-bring any rescue inhalers with you DOS       DO NOT take blood pressure medications ending in \"evelin\" or \"pril\" the AM of procedure or evening prior     Take half or your normal dose of any long acting insulins the night before your procedure-do not take any diabetic medications the AM of procedure.      If you take a weekly injection for diabetes or weight loss-do not take one week prior to surgery/procedure.If you have already taken your injection this week,contact your surgeon. There is a possibility of cancellation if taken.     If you take any SGLT2  medications such as Jardiance ,Invokana, Synjardy or Farxiga. You need to stop these 3 days prior to surgery/procedure. If you have already taken, contact your surgeon. There is a possibility of cancellation if taken.     Follow your doctors instructions regarding stopping or taking  any blood thinners-if you

## 2025-07-29 ENCOUNTER — HOSPITAL ENCOUNTER (OUTPATIENT)
Age: 80
Setting detail: OUTPATIENT SURGERY
Discharge: HOME | End: 2025-07-29
Attending: INTERNAL MEDICINE | Admitting: INTERNAL MEDICINE
Payer: MEDICARE

## 2025-07-29 ENCOUNTER — ANESTHESIA (OUTPATIENT)
Dept: ENDOSCOPY | Age: 80
End: 2025-07-29
Payer: MEDICARE

## 2025-07-29 ENCOUNTER — ANESTHESIA EVENT (OUTPATIENT)
Dept: ENDOSCOPY | Age: 80
End: 2025-07-29
Payer: MEDICARE

## 2025-07-29 VITALS
SYSTOLIC BLOOD PRESSURE: 138 MMHG | BODY MASS INDEX: 17.49 KG/M2 | OXYGEN SATURATION: 100 % | WEIGHT: 105 LBS | HEIGHT: 65 IN | DIASTOLIC BLOOD PRESSURE: 72 MMHG | TEMPERATURE: 96.9 F | HEART RATE: 58 BPM | RESPIRATION RATE: 16 BRPM

## 2025-07-29 DIAGNOSIS — R10.84 GENERALIZED ABDOMINAL PAIN: ICD-10-CM

## 2025-07-29 DIAGNOSIS — R19.8 IRREGULAR BOWEL HABITS: ICD-10-CM

## 2025-07-29 PROCEDURE — 7100000010 HC PHASE II RECOVERY - FIRST 15 MIN: Performed by: INTERNAL MEDICINE

## 2025-07-29 PROCEDURE — 6360000002 HC RX W HCPCS: Performed by: NURSE ANESTHETIST, CERTIFIED REGISTERED

## 2025-07-29 PROCEDURE — 2709999900 HC NON-CHARGEABLE SUPPLY: Performed by: INTERNAL MEDICINE

## 2025-07-29 PROCEDURE — 2580000003 HC RX 258: Performed by: ANESTHESIOLOGY

## 2025-07-29 PROCEDURE — 3609010600 HC COLONOSCOPY POLYPECTOMY SNARE/COLD BIOPSY: Performed by: INTERNAL MEDICINE

## 2025-07-29 PROCEDURE — 3700000000 HC ANESTHESIA ATTENDED CARE: Performed by: INTERNAL MEDICINE

## 2025-07-29 PROCEDURE — 7100000011 HC PHASE II RECOVERY - ADDTL 15 MIN: Performed by: INTERNAL MEDICINE

## 2025-07-29 PROCEDURE — 3700000001 HC ADD 15 MINUTES (ANESTHESIA): Performed by: INTERNAL MEDICINE

## 2025-07-29 PROCEDURE — C1726 CATH, BAL DIL, NON-VASCULAR: HCPCS | Performed by: INTERNAL MEDICINE

## 2025-07-29 PROCEDURE — 88305 TISSUE EXAM BY PATHOLOGIST: CPT

## 2025-07-29 PROCEDURE — 3609017700 HC EGD DILATION GASTRIC/DUODENAL STRICTURE: Performed by: INTERNAL MEDICINE

## 2025-07-29 PROCEDURE — 3609012400 HC EGD TRANSORAL BIOPSY SINGLE/MULTIPLE: Performed by: INTERNAL MEDICINE

## 2025-07-29 RX ORDER — LIDOCAINE HYDROCHLORIDE 20 MG/ML
INJECTION, SOLUTION EPIDURAL; INFILTRATION; INTRACAUDAL; PERINEURAL
Status: DISCONTINUED | OUTPATIENT
Start: 2025-07-29 | End: 2025-07-29 | Stop reason: SDUPTHER

## 2025-07-29 RX ORDER — PROPOFOL 10 MG/ML
INJECTION, EMULSION INTRAVENOUS
Status: DISCONTINUED | OUTPATIENT
Start: 2025-07-29 | End: 2025-07-29 | Stop reason: SDUPTHER

## 2025-07-29 RX ORDER — SODIUM CHLORIDE 9 MG/ML
INJECTION, SOLUTION INTRAVENOUS CONTINUOUS
Status: DISCONTINUED | OUTPATIENT
Start: 2025-07-29 | End: 2025-07-29 | Stop reason: HOSPADM

## 2025-07-29 RX ADMIN — LIDOCAINE HYDROCHLORIDE 50 MG: 20 INJECTION, SOLUTION EPIDURAL; INFILTRATION; INTRACAUDAL; PERINEURAL at 11:56

## 2025-07-29 RX ADMIN — PROPOFOL 50 MG: 10 INJECTION, EMULSION INTRAVENOUS at 11:59

## 2025-07-29 RX ADMIN — PROPOFOL 140 MCG/KG/MIN: 10 INJECTION, EMULSION INTRAVENOUS at 11:56

## 2025-07-29 RX ADMIN — PROPOFOL 50 MG: 10 INJECTION, EMULSION INTRAVENOUS at 11:56

## 2025-07-29 RX ADMIN — SODIUM CHLORIDE: 0.9 INJECTION, SOLUTION INTRAVENOUS at 11:03

## 2025-07-29 ASSESSMENT — PAIN - FUNCTIONAL ASSESSMENT
PAIN_FUNCTIONAL_ASSESSMENT: 0-10

## 2025-07-29 ASSESSMENT — ENCOUNTER SYMPTOMS: SHORTNESS OF BREATH: 0

## 2025-07-29 NOTE — H&P
Pre-operative History and Physical    Patient: Sheila Sierra  : 1945  Acct#:     History Obtained From:  patient    HISTORY OF PRESENT ILLNESS:    The patient is a 79 y.o. female with pill dysphagia, abdominal pain and anemia  who presents with for EGD & colonoscopy.    Past Medical History:        Diagnosis Date    Allergic rhinitis     Celiac disease     Chronic fatigue syndrome     Chronic kidney disease     COVID     Dysphagia     Episodes of decreased attentiveness     GERD (gastroesophageal reflux disease)     Hypertension     Hypothyroidism     Lichen planus     Malaria 1980-1988    Osteoporosis     Whooping cough      Past Surgical History:        Procedure Laterality Date    CATARACT REMOVAL      HYSTERECTOMY (CERVIX STATUS UNKNOWN)      TONSILLECTOMY       Medications Prior to Admission:   No current facility-administered medications on file prior to encounter.     Current Outpatient Medications on File Prior to Encounter   Medication Sig Dispense Refill    estradiol (ESTRACE) 0.5 MG tablet Take 1 tablet by mouth daily 90 tablet 1    furosemide (LASIX) 20 MG tablet Take 1 tablet by mouth daily 90 tablet 1    levothyroxine (SYNTHROID) 50 MCG tablet Take 1 tablet by mouth Daily 90 tablet 1    vitamin D (ERGOCALCIFEROL) 1.25 MG (03449 UT) CAPS capsule Take 1 capsule by mouth once a week 12 capsule 3    Glycerin-Polysorbate 80 (REFRESH DRY EYE THERAPY OP) Apply to eye      acetaminophen (TYLENOL) 500 MG tablet Take 1 tablet by mouth every 6 hours as needed for Pain       Allergies:  Ace inhibitors, Belladonna, Beta adrenergic blockers, Flagyl [metronidazole], Gluten, Imodium [loperamide], Iodinated contrast media, and Scopolamine    History of allergic reaction to anesthesia:  No    Social History:   U/R  Family History:   No FH of colon cancer       PHYSICAL EXAM:      BP (!) 142/63   Pulse 67   Temp 98.8 °F (37.1 °C) (Temporal)   Resp 20   Ht 1.651 m (5' 5\")

## 2025-07-29 NOTE — ANESTHESIA POSTPROCEDURE EVALUATION
Department of Anesthesiology  Postprocedure Note    Patient: Sheila Sierra  MRN: [id number]  YOB: 1945  Date of evaluation: 7/29/2025    Procedure Summary       Date: 07/29/25 Room / Location: [place] [address] / [facility]    Anesthesia Start: 1151 Anesthesia Stop: 1226    Procedures:       COLONOSCOPY POLYPECTOMY SNARE/BIOPSY      ESOPHAGOGASTRODUODENOSCOPY BIOPSY (Abdomen)      ESOPHAGOGASTRODUODENOSCOPY DILATION BALLOON (Abdomen) Diagnosis:       Generalized abdominal pain      Irregular bowel habits      (Generalized abdominal pain [R10.84])      (Irregular bowel habits [R19.8])    Surgeons: Anibal Springer MD Responsible Provider: Michele Trevizo MD    Anesthesia Type: MAC ASA Status: 3            Anesthesia Type: No value filed.    Roman Phase I: Roman Score: 10    Roman Phase II:      Anesthesia Post Evaluation    Patient location during evaluation: PACU  Patient participation: complete - patient participated  Level of consciousness: awake  Airway patency: patent  Nausea & Vomiting: no nausea and no vomiting  Cardiovascular status: hemodynamically stable  Respiratory status: acceptable  Hydration status: stable  Multimodal analgesia pain management approach  Pain management: adequate    No notable events documented.

## 2025-07-29 NOTE — DISCHARGE INSTRUCTIONS
EGD and COLONOSCOPY DISCHARGE INSTRUCTIONS    EGD DISCHARGE INSTRUCTIONS    Use salt water gargle, lozenges, or Chloraseptic spray as needed for sore throat.    If you have fever, chills, excessive bleeding, severe chest pain, or abdominal pain, or any other problems, contact your physician's office immediately at [phone].    If you had an esophageal dilatation, and experience fever, chills, excessive bleeding, shortness of breath, chest or abdominal pain, or any other unusual symptom, call the office immediately at [phone].    Continue home medications as directed.    Call physician's office in 14 business days for biopsy results or further instructions.    You need another EGD in _______________________________.      Call your physician's office at [phone] for a follow-up appointment in _______________.    See your physician's report for procedure details and recommendations.    COLONOSCOPY DISCHARGE INSTRUCTIONS    You may experience some lightheadedness for the next several hours.  Plan on quiet relaxation for the rest of today. Nap for four hours following procedure if possible.  A responsible adult needs to stay with you today.    Eat bland food and avoid anything greasy or spicy initially-progress to your normal diet gradually.  Diet restrictions as instructed.    You may resume home medications as instructed.    It is not unusual to experience some mild cramping or gas pains, and you may not have a bowel movement for several days.    If you have any of the following problems, notify your physician or return to the hospital emergency room : fever, chills, excessive bleeding, excessive vomiting, difficulty swallowing, uncontrolled pain, increased abdominal distention, shortness of breath or any other problems.    If you had a polyp removed, avoid strenuous activity for 48 hours.Avoid the use of aspirin or related compounds for one week, 
CBC/CMP/PT/PTT/INR/Type and Screen/EKG

## 2025-07-29 NOTE — ANESTHESIA PRE PROCEDURE
Department of Anesthesiology  Preprocedure Note       Name:  Sheila Sierra   Age:  79 y.o.  :  1945                                          MRN:  [id number]         Date:  2025      Surgeon: Surgeon(s):  Anibal Springer MD    Procedure: Procedure(s):  COLONOSCOPY DIAGNOSTIC  ESOPHAGOGASTRODUODENOSCOPY DIAGNOSTIC ONLY    Medications prior to admission:   Prior to Admission medications    Medication Sig Start Date End Date Taking? Authorizing Provider   estradiol (ESTRACE) 0.5 MG tablet Take 1 tablet by mouth daily 25  Yes Madai Gaming MD   furosemide (LASIX) 20 MG tablet Take 1 tablet by mouth daily 25  Yes Madai Gaming MD   levothyroxine (SYNTHROID) 50 MCG tablet Take 1 tablet by mouth Daily 25  Yes Madai Gaming MD   vitamin D (ERGOCALCIFEROL) 1.25 MG (87943 UT) CAPS capsule Take 1 capsule by mouth once a week 25   Madai Gaming MD   Glycerin-Polysorbate 80 (REFRESH DRY EYE THERAPY OP) Apply to eye    ProviderAtif MD   acetaminophen (TYLENOL) 500 MG tablet Take 1 tablet by mouth every 6 hours as needed for Pain    Provider, MD Atif       Current medications:    Current Facility-Administered Medications   Medication Dose Route Frequency Provider Last Rate Last Admin   • 0.9 % sodium chloride infusion   IntraVENous Continuous Michele Trevizo  mL/hr at 25 1103 New Bag at 25 1103       Allergies:    Allergies   Allergen Reactions   • Ace Inhibitors    • Belladonna    • Beta Adrenergic Blockers    • Flagyl [Metronidazole]    • Gluten    • Imodium [Loperamide] Other (See Comments)     Disoriented    • Iodinated Contrast Media    • Scopolamine        Problem List:    Patient Active Problem List   Diagnosis Code   • Acquired hypothyroidism E03.9   • Primary hypertension I10   • Environmental and seasonal allergies J30.89   • Gastroesophageal reflux disease without esophagitis K21.9   • Vertigo, labyrinthine, unspecified laterality H81.09   •

## 2025-07-31 ENCOUNTER — HOSPITAL ENCOUNTER (OUTPATIENT)
Dept: GENERAL RADIOLOGY | Age: 80
Discharge: HOME | End: 2025-07-31
Payer: MEDICARE

## 2025-07-31 DIAGNOSIS — M81.0 AGE-RELATED OSTEOPOROSIS WITHOUT CURRENT PATHOLOGICAL FRACTURE: ICD-10-CM

## 2025-07-31 PROCEDURE — 77080 DXA BONE DENSITY AXIAL: CPT

## 2025-08-12 ENCOUNTER — RESULTS FOLLOW-UP (OUTPATIENT)
Dept: INTERNAL MEDICINE CLINIC | Age: 80
End: 2025-08-12

## (undated) DEVICE — MOUTHPIECE ENDOSCP L CTRL OPN AND SIDE PORTS DISP

## (undated) DEVICE — FORCEPS BX L240CM WRK CHN 2.8MM STD CAP W/ NDL MIC MESH

## (undated) DEVICE — TUBING IRRIG COMPATIBLE W ERBE MEDIVATOR PMP HYDR

## (undated) DEVICE — BW-412T DISP COMBO CLEANING BRUSH: Brand: SINGLE USE COMBINATION CLEANING BRUSH

## (undated) DEVICE — SYRINGE INFL 60ML DISP ALLIANCE II

## (undated) DEVICE — ENDOSCOPIC KIT 6X3/16 FT COLON W/ 1.1 OZ 2 GWN W/O BRSH

## (undated) DEVICE — GOWN AURORA NONREINF LG: Brand: MEDLINE INDUSTRIES, INC.

## (undated) DEVICE — CAP WATER BTL AIR TBNG L 16 IN CO2 TBNG L 48 IN ENDOSCP

## (undated) DEVICE — SNARES COLD OVAL 10MM THIN

## (undated) DEVICE — SOLUTION IRRIG 500ML STRL H2O NONPYROGENIC

## (undated) DEVICE — AIR/WATER CLEANING ADAPTER FOR OLYMPUS® GI ENDOSCOPE: Brand: BULLDOG®

## (undated) DEVICE — SINGLE USE AIR/WATER, SUCTION AND BIOPSY VALVES SET: Brand: ORCAPOD™

## (undated) DEVICE — DILATOR ENDOSCP L180CM DIA6FR BLLN L8CM DIA54-60FR